# Patient Record
Sex: FEMALE | Race: WHITE | HISPANIC OR LATINO | Employment: FULL TIME | URBAN - METROPOLITAN AREA
[De-identification: names, ages, dates, MRNs, and addresses within clinical notes are randomized per-mention and may not be internally consistent; named-entity substitution may affect disease eponyms.]

---

## 2017-01-21 ENCOUNTER — HOSPITAL ENCOUNTER (OUTPATIENT)
Dept: MAMMOGRAPHY | Facility: HOSPITAL | Age: 52
Discharge: HOME | End: 2017-01-21
Payer: COMMERCIAL

## 2017-01-21 DIAGNOSIS — Z12.31 ENCOUNTER FOR SCREENING MAMMOGRAM FOR MALIGNANT NEOPLASM OF BREAST: ICD-10-CM

## 2017-01-21 DIAGNOSIS — N95.1 FEMALE CLIMACTERIC STATE: ICD-10-CM

## 2017-01-21 PROCEDURE — G0202 SCR MAMMO BI INCL CAD: HCPCS

## 2017-05-17 ENCOUNTER — ALLSCRIPTS OFFICE VISIT (OUTPATIENT)
Dept: OTHER | Facility: OTHER | Age: 52
End: 2017-05-17

## 2017-05-17 DIAGNOSIS — K21.9 GASTRO-ESOPHAGEAL REFLUX DISEASE WITHOUT ESOPHAGITIS: ICD-10-CM

## 2017-05-17 DIAGNOSIS — E55.9 VITAMIN D DEFICIENCY: ICD-10-CM

## 2017-05-17 DIAGNOSIS — J30.9 ALLERGIC RHINITIS: ICD-10-CM

## 2017-05-17 DIAGNOSIS — E03.9 HYPOTHYROIDISM: ICD-10-CM

## 2017-06-16 ENCOUNTER — GENERIC CONVERSION - ENCOUNTER (OUTPATIENT)
Dept: OTHER | Facility: OTHER | Age: 52
End: 2017-06-16

## 2017-06-16 ENCOUNTER — APPOINTMENT (OUTPATIENT)
Dept: LAB | Facility: CLINIC | Age: 52
End: 2017-06-16
Payer: COMMERCIAL

## 2017-06-16 DIAGNOSIS — K21.9 GASTRO-ESOPHAGEAL REFLUX DISEASE WITHOUT ESOPHAGITIS: ICD-10-CM

## 2017-06-16 DIAGNOSIS — E03.9 HYPOTHYROIDISM: ICD-10-CM

## 2017-06-16 DIAGNOSIS — E55.9 VITAMIN D DEFICIENCY: ICD-10-CM

## 2017-06-16 DIAGNOSIS — J30.9 ALLERGIC RHINITIS: ICD-10-CM

## 2017-06-16 LAB
25(OH)D3 SERPL-MCNC: 32.9 NG/ML (ref 30–100)
ALBUMIN SERPL BCP-MCNC: 3.7 G/DL (ref 3.5–5)
ALP SERPL-CCNC: 76 U/L (ref 46–116)
ALT SERPL W P-5'-P-CCNC: 22 U/L (ref 12–78)
ANION GAP SERPL CALCULATED.3IONS-SCNC: 5 MMOL/L (ref 4–13)
AST SERPL W P-5'-P-CCNC: 19 U/L (ref 5–45)
BASOPHILS # BLD AUTO: 0.04 THOUSANDS/ΜL (ref 0–0.1)
BASOPHILS NFR BLD AUTO: 1 % (ref 0–1)
BILIRUB SERPL-MCNC: 1.13 MG/DL (ref 0.2–1)
BILIRUB UR QL STRIP: NEGATIVE
BUN SERPL-MCNC: 16 MG/DL (ref 5–25)
CALCIUM SERPL-MCNC: 8.9 MG/DL (ref 8.3–10.1)
CHLORIDE SERPL-SCNC: 107 MMOL/L (ref 100–108)
CHOLEST SERPL-MCNC: 192 MG/DL (ref 50–200)
CLARITY UR: CLEAR
CO2 SERPL-SCNC: 28 MMOL/L (ref 21–32)
COLOR UR: YELLOW
CREAT SERPL-MCNC: 0.65 MG/DL (ref 0.6–1.3)
EOSINOPHIL # BLD AUTO: 0.08 THOUSAND/ΜL (ref 0–0.61)
EOSINOPHIL NFR BLD AUTO: 2 % (ref 0–6)
ERYTHROCYTE [DISTWIDTH] IN BLOOD BY AUTOMATED COUNT: 12.7 % (ref 11.6–15.1)
GFR SERPL CREATININE-BSD FRML MDRD: >60 ML/MIN/1.73SQ M
GLUCOSE P FAST SERPL-MCNC: 68 MG/DL (ref 65–99)
GLUCOSE UR STRIP-MCNC: NEGATIVE MG/DL
HCT VFR BLD AUTO: 40.3 % (ref 34.8–46.1)
HDLC SERPL-MCNC: 76 MG/DL (ref 40–60)
HGB BLD-MCNC: 13.2 G/DL (ref 11.5–15.4)
HGB UR QL STRIP.AUTO: NEGATIVE
KETONES UR STRIP-MCNC: NEGATIVE MG/DL
LDLC SERPL CALC-MCNC: 105 MG/DL (ref 0–100)
LEUKOCYTE ESTERASE UR QL STRIP: NEGATIVE
LYMPHOCYTES # BLD AUTO: 1.53 THOUSANDS/ΜL (ref 0.6–4.47)
LYMPHOCYTES NFR BLD AUTO: 36 % (ref 14–44)
MCH RBC QN AUTO: 28.1 PG (ref 26.8–34.3)
MCHC RBC AUTO-ENTMCNC: 32.8 G/DL (ref 31.4–37.4)
MCV RBC AUTO: 86 FL (ref 82–98)
MONOCYTES # BLD AUTO: 0.32 THOUSAND/ΜL (ref 0.17–1.22)
MONOCYTES NFR BLD AUTO: 7 % (ref 4–12)
NEUTROPHILS # BLD AUTO: 2.33 THOUSANDS/ΜL (ref 1.85–7.62)
NEUTS SEG NFR BLD AUTO: 54 % (ref 43–75)
NITRITE UR QL STRIP: NEGATIVE
NRBC BLD AUTO-RTO: 0 /100 WBCS
PH UR STRIP.AUTO: 7 [PH] (ref 4.5–8)
PLATELET # BLD AUTO: 238 THOUSANDS/UL (ref 149–390)
PMV BLD AUTO: 10.9 FL (ref 8.9–12.7)
POTASSIUM SERPL-SCNC: 4.3 MMOL/L (ref 3.5–5.3)
PROT SERPL-MCNC: 6.4 G/DL (ref 6.4–8.2)
PROT UR STRIP-MCNC: NEGATIVE MG/DL
RBC # BLD AUTO: 4.69 MILLION/UL (ref 3.81–5.12)
SODIUM SERPL-SCNC: 140 MMOL/L (ref 136–145)
SP GR UR STRIP.AUTO: 1.02 (ref 1–1.03)
T4 FREE SERPL-MCNC: 1 NG/DL (ref 0.76–1.46)
TRIGL SERPL-MCNC: 56 MG/DL
TSH SERPL DL<=0.05 MIU/L-ACNC: 4.83 UIU/ML (ref 0.36–3.74)
UROBILINOGEN UR QL STRIP.AUTO: 1 E.U./DL
WBC # BLD AUTO: 4.3 THOUSAND/UL (ref 4.31–10.16)

## 2017-06-16 PROCEDURE — 82306 VITAMIN D 25 HYDROXY: CPT

## 2017-06-16 PROCEDURE — 81003 URINALYSIS AUTO W/O SCOPE: CPT

## 2017-06-16 PROCEDURE — 84439 ASSAY OF FREE THYROXINE: CPT

## 2017-06-16 PROCEDURE — 80061 LIPID PANEL: CPT

## 2017-06-16 PROCEDURE — 80053 COMPREHEN METABOLIC PANEL: CPT

## 2017-06-16 PROCEDURE — 85025 COMPLETE CBC W/AUTO DIFF WBC: CPT

## 2017-06-16 PROCEDURE — 36415 COLL VENOUS BLD VENIPUNCTURE: CPT

## 2017-06-16 PROCEDURE — 84443 ASSAY THYROID STIM HORMONE: CPT

## 2017-07-28 DIAGNOSIS — E03.9 HYPOTHYROIDISM: ICD-10-CM

## 2017-10-11 ENCOUNTER — OFFICE VISIT (OUTPATIENT)
Dept: URGENT CARE | Age: 52
End: 2017-10-11
Payer: COMMERCIAL

## 2017-10-11 PROCEDURE — 99213 OFFICE O/P EST LOW 20 MIN: CPT

## 2017-10-13 NOTE — PROGRESS NOTES
Assessment  1  Temporomandibular disorder (524 60) (M26 609)   2  Otalgia, right (145 70) (H92 01)    Plan  Otalgia, right    · Neomycin-Polymyxin-HC 1 % Otic Solution; INSTILL 3 DROPS IN AFFECTED  EAR(S) 3-4 TIMES DAILY  Temporomandibular disorder    · Ibuprofen 600 MG Oral Tablet; TAKE 1 TABLET EVERY 6 TO 8 HOURS AS NEEDED   · Methocarbamol 750 MG Oral Tablet (Robaxin-750); 1/2 to 1 tablet every 6-8 hours  or before bed for jaw pain  Home use only    Discussion/Summary  Discussion Summary:   Take antiinflammatory as directed  Use ear drops as directed  May use muscle relaxants as needed for pain and tightness in jaw  Warm compresses to ears as needed  Avoid excessive chewing  Medication Side Effects Reviewed: Possible side effects of new medications were reviewed with the patient/guardian today  Understands and agrees with treatment plan: The treatment plan was reviewed with the patient/guardian  The patient/guardian understands and agrees with the treatment plan   Counseling Documentation With Imm: The patient was counseled regarding instructions for management  Chief Complaint  1  Ear Pain   2  Headache  Chief Complaint Free Text Note Form: bilateral ear pain radiated to the temple area and headache for 2 days  History of Present Illness  HPI: 49-year-old female with bilateral ear pain and pain in jaw that radiates up into the temple area that started a couple days ago  No visual changes or hearing changes  No URI symptoms  Hospital Based Practices Required Assessment:   Pain Assessment   the patient states they have pain  The patient describes the pain as throbbing  (on a scale of 0 to 10, the patient rates the pain at 6 )   Abuse And Domestic Violence Screen    Yes, the patient is safe at home -The patient states no one is hurting them  Depression And Suicide Screen  No, the patient has not had thoughts of hurting themself  No, the patient has not felt depressed in the past 7 days  Prefered Language is  english  Review of Systems  Focused-Female:   Constitutional: No fever, no chills, feels well, no tiredness, no recent weight gain or loss  ENT: as noted in HPI  Cardiovascular: no complaints of slow or fast heart rate, no chest pain, no palpitations, no leg claudication or lower extremity edema  Respiratory: no complaints of shortness of breath, no wheezing, no dyspnea on exertion, no orthopnea or PND  Active Problems  1  Acute bronchitis (466 0) (J20 9)   2  Allergic rhinitis (477 9) (J30 9)   3  Asthma (493 90) (J45 909)   4  Cervical cancer screening (V76 2) (Z12 4)   5  Disc degeneration (722 6)   6  Encounter for routine gynecological examination (V72 31) (Z01 419)   7  Encounter for screening mammogram for breast cancer (V76 12) (Z12 31)   8  GERD (gastroesophageal reflux disease) (530 81) (K21 9)   9  Hemorrhoids (455 6) (K64 9)   10  Hypothyroidism (244 9) (E03 9)   11  Pelvic pain syndrome (625 5) (N94 89)   12  Thyroid nodule (241 0) (E04 1)   13  Vaginal dryness, menopausal (627 2) (N95 1)   14  Vaginal Pap smear (V76 47) (Z12 72)   15  Visit for routine gyn exam (V72 31) (Z01 419)   16  Vitamin D deficiency (268 9) (E55 9)    Past Medical History  1  History of Fibroids, subserous (218 2) (D25 2)   2  History of endometriosis (V13 29) (Z87 42)   3  History of hypothyroidism (V12 29) (Z86 39)  Active Problems And Past Medical History Reviewed: The active problems and past medical history were reviewed and updated today  Family History  Mother    1  Family history of   Father    2  Family history of hypertension (V17 49) (Z82 49)   3  Family history of kidney disease (V18 69) (Z84 1)  Sister    4  Family history of cardiac disorder (V17 49) (Z82 49)   5  Family history of diabetes mellitus (V18 0) (Z83 3)   6  Family history of Hypothyroidism, goitrous  Family History Reviewed: The family history was reviewed and updated today         Social History   · Always uses seat belt   · Caffeine use (V49 89) (F15 90)   · Former smoker (L15 84) (Y83 614)   ·    · No drug use   · One child   · Social alcohol use (Z78 9)  Social History Reviewed: The social history was reviewed and updated today  Surgical History  1  History of Cholecystectomy   2  History of Hysterectomy   3  History of Tubal Ligation  Surgical History Reviewed: The surgical history was reviewed and updated today  Current Meds   1  Synthroid 137 MCG Oral Tablet; Take 1 tablet daily; Therapy: 57ELI2316 to (Last Rx:16Jun2017)  Requested for: 26ZDA8595 Ordered   2  Vitamin D 1000 UNIT CAPS; TAKE 2 CAPSULE Daily; Therapy: 95QQS6332 to Recorded  Medication List Reviewed: The medication list was reviewed and updated today  Allergies  1  Oxycodone-Acetaminophen TABS  2  Seasonal    Vitals  Signs   Recorded: 88CVX8152 07:00PM   Temperature: 97 7 F, Temporal  Heart Rate: 75  Respiration: 20  Systolic: 807  Diastolic: 55  Height: 5 ft 7 5 in  Weight: 198 lb   BMI Calculated: 30 55  BSA Calculated: 2 02  O2 Saturation: 97  LMP: full hysterectomy  Pain Scale: 6    Physical Exam    Constitutional Well-developed well-nourished female no acute distress  Eyes   Conjunctiva and lids: No swelling, erythema or discharge  Pupils and irises: Equal, round and reactive to light  Ears, Nose, Mouth, and Throat   External inspection of ears and nose: Normal     Otoscopic examination: Abnormal  -mild redness right EAC but no swelling  Tender to palpation bilateral TMJ region  Nasal mucosa, septum, and turbinates: Normal without edema or erythema  Oropharynx: Normal with no erythema, edema, exudate or lesions  Pulmonary   Respiratory effort: No increased work of breathing or signs of respiratory distress  Cardiovascular   Palpation of heart: Normal PMI, no thrills  Lymphatic   Palpation of lymph nodes in neck: No lymphadenopathy      Skin   Skin and subcutaneous tissue: Normal without rashes or lesions  Psychiatric   Orientation to person, place, and time: Normal     Mood and affect: Normal        Future Appointments    Date/Time Provider Specialty Site   11/15/2017 06:30 PM Cinthia Hebert, [facility]     Signatures   Electronically signed by :  Jess Rubinstein, [facility]; Oct 11 2017  7:17PM EST                       (Author)    Electronically signed by : Polina Carreon DO; Oct 12 2017  7:07AM EST                       (Co-author)

## 2017-11-15 ENCOUNTER — ALLSCRIPTS OFFICE VISIT (OUTPATIENT)
Dept: OTHER | Facility: OTHER | Age: 52
End: 2017-11-15

## 2017-11-27 ENCOUNTER — ALLSCRIPTS OFFICE VISIT (OUTPATIENT)
Dept: OTHER | Facility: OTHER | Age: 52
End: 2017-11-27

## 2018-01-10 NOTE — RESULT NOTES
Verified Results  (1) TSH WITH FT4 REFLEX [id number] 08:35PM Arjun Stephen     Test Name Result Flag Reference   TSH 8 760 uIU/mL H 0 358-3 740   Patients undergoing fluorescein dye angiography may retain small amounts of fluorescein in the body for 48-72 hours post procedure  Samples containing fluorescein can produce falsely depressed TSH values  If the patient had this procedure,a specimen should be resubmitted post fluorescein clearance  The recommended reference ranges for TSH during pregnancy are as follows:  First trimester 0 1 to 2 5 uIU/mL  Second trimester  0 2 to 3 0 uIU/mL  Third trimester 0 3 to 3 0 uIU/m     (1) TSH WITH FT4 REFLEX [id number] 08:35PM [facility]     Test Name Result Flag Reference   T4,FREE 0 94 ng/dL  0 76-1 46       Discussion/Summary   Thyroid function is underactive  Recommend patient increase Synthroid to 125 Âµg daily and recheck TSH and free T4 in 6 weeks

## 2018-01-12 NOTE — RESULT NOTES
Discussion/Summary   Labs okay except thyroid function is mildly underactive  Recommend patient increase Synthroid to 137 Âµg daily and recheck TSH in 6 weeks  Verified Results  (1) CBC/PLT/DIFF 44KEH0722 08:05AM [facility] Order Number: [id number]     Test Name Result Flag Reference   WBC COUNT 4 30 Thousand/uL L 4 31-10 16   RBC COUNT 4 69 Million/uL  3 81-5 12   HEMOGLOBIN 13 2 g/dL  11 5-15 4   HEMATOCRIT 40 3 %  34 8-46  1   MCV 86 fL  82-98   MCH 28 1 pg  26 8-34 3   MCHC 32 8 g/dL  31 4-37 4   RDW 12 7 %  11 6-15 1   MPV 10 9 fL  8 9-12 7   PLATELET COUNT 256 Thousands/uL  149-390   nRBC AUTOMATED 0 /100 WBCs     NEUTROPHILS RELATIVE PERCENT 54 %  43-75   LYMPHOCYTES RELATIVE PERCENT 36 %  14-44   MONOCYTES RELATIVE PERCENT 7 %  4-12   EOSINOPHILS RELATIVE PERCENT 2 %  0-6   BASOPHILS RELATIVE PERCENT 1 %  0-1   NEUTROPHILS ABSOLUTE COUNT 2 33 Thousands/? ??L  1 85-7 62   LYMPHOCYTES ABSOLUTE COUNT 1 53 Thousands/? ??L  0 60-4 47   MONOCYTES ABSOLUTE COUNT 0 32 Thousand/? ??L  0 17-1 22   EOSINOPHILS ABSOLUTE COUNT 0 08 Thousand/? ??L  0 00-0 61   BASOPHILS ABSOLUTE COUNT 0 04 Thousands/? ??L  0 00-0 10     (1) COMPREHENSIVE METABOLIC PANEL 35OMI8098 69:12MU [facility] Order Number: [id number]     Test Name Result Flag Reference   SODIUM 140 mmol/L  136-145   POTASSIUM 4 3 mmol/L  3 5-5 3   CHLORIDE 107 mmol/L  100-108   CARBON DIOXIDE 28 mmol/L  21-32   ANION GAP (CALC) 5 mmol/L  4-13   BLOOD UREA NITROGEN 16 mg/dL  5-25   CREATININE 0 65 mg/dL  0 60-1 30   Standardized to IDMS reference method   CALCIUM 8 9 mg/dL  8 3-10 1   BILI, TOTAL 1 13 mg/dL H 0 20-1 00   ALK PHOSPHATAS 76 U/L     ALT (SGPT) 22 U/L  12-78   AST(SGOT) 19 U/L  5-45   ALBUMIN 3 7 g/dL  3 5-5 0   TOTAL PROTEIN 6 4 g/dL  6 4-8 2   eGFR Non-African American      >60 0 ml/min/1 73sq m   [employer] Disease Education Program recommendations are as follows:  GFR calculation is accurate only with a steady state creatinine  Chronic Kidney disease less than 60 ml/min/1 73 sq  meters  Kidney failure less than 15 ml/min/1 73 sq  meters  GLUCOSE FASTING 68 mg/dL  65-99     (1) LIPID PANEL, FASTING 16Jun2017 08:05AM [facility] Order Number: [id number]     Test Name Result Flag Reference   CHOLESTEROL 192 mg/dL     HDL,DIRECT 76 mg/dL H 40-60   Specimen collection should occur prior to Metamizole administration due to the potential for falsely depressed results  LDL CHOLESTEROL CALCULATED 105 mg/dL H 0-100   Triglyceride:         Normal              <150 mg/dl       Borderline High    150-199 mg/dl       High               200-499 mg/dl       Very High          >499 mg/dl  Cholesterol:         Desirable        <200 mg/dl      Borderline High  200-239 mg/dl      High             >239 mg/dl  HDL Cholesterol:        High    >59 mg/dL      Low     <41 mg/dL  LDL CALCULATED:    This screening LDL is a calculated result  It does not have the accuracy of the Direct Measured LDL in the monitoring of patients with hyperlipidemia and/or statin therapy  Direct Measure LDL (RSD911) must be ordered separately in these patients  TRIGLYCERIDES 56 mg/dL  <=150   Specimen collection should occur prior to N-Acetylcysteine or Metamizole administration due to the potential for falsely depressed results  (1) TSH WITH FT4 REFLEX [id number] 08:05AM [facility] Order Number: [id number]     Test Name Result Flag Reference   TSH 4 830 uIU/mL H 0 358-3 740   Patients undergoing fluorescein dye angiography may retain small amounts of fluorescein in the body for 48-72 hours post procedure  Samples containing fluorescein can produce falsely depressed TSH values  If the patient had this procedure,a specimen should be resubmitted post fluorescein clearance            The recommended reference ranges for TSH during pregnancy are as follows:  First trimester 0 1 to 2 5 uIU/mL  Second trimester  0 2 to 3 0 uIU/mL  Third trimester 0 3 to 3 0 uIU/m     (1) VITAMIN D 25-HYDROXY 72IAW2595 08:05AM Stephon Sebastian Order Number: [id number]     Test Name Result Flag Reference   VIT D 25-HYDROX 32 9 ng/mL  30 0-100 0   This assay is a certified procedure of the CDC Vitamin D Standardization Certification Program (VDSCP)     Deficiency <20ng/ml   Insufficiency 20-30ng/ml   Sufficient  ng/ml     *Patients undergoing fluorescein dye angiography may retain small amounts of fluorescein in the body for 48-72 hours post procedure  Samples containing fluorescein can produce falsely elevated Vitamin D values  If the patient had this procedure, a specimen should be resubmitted post fluorescein clearance  (1) URINALYSIS w URINE C/S REFLEX (will reflex a microscopy if leukocytes, occult blood, or nitrites are not within normal limits) [id number] 08:05AM Stephon Sebastian Order Number: [id number]     Test Name Result Flag Reference   COLOR Yellow     CLARITY Clear     PH UA 7 0  4 5-8 0   LEUKOCYTE ESTERASE UA Negative  Negative   NITRITE UA Negative  Negative   PROTEIN UA Negative mg/dl  Negative   GLUCOSE UA Negative mg/dl  Negative   KETONES UA Negative mg/dl  Negative   UROBILINOGEN UA 1 0 E U /dl  0 2, 1 0 E U /dl   BILIRUBIN UA Negative  Negative   BLOOD UA Negative  Negative   SPECIFIC GRAVITY UA 1 020  1 003-1 030       Plan  Hypothyroidism    · (1) TSH WITH FT4 REFLEX; Status:Active;  Requested for:62Kat9267;

## 2018-01-12 NOTE — RESULT NOTES
Verified Results  (1) TSH WITH FT4 REFLEX [id number] 07:18AM Jessie Dalzell Order Number: [id number]     Test Name Result Flag Reference   TSH 1 390 uIU/mL  0 358-3 740   Patients undergoing fluorescein dye angiography may retain small amounts of fluorescein in the body for 48-72 hours post procedure  Samples containing fluorescein can produce falsely depressed TSH values  If the patient had this procedure,a specimen should be resubmitted post fluorescein clearance  The recommended reference ranges for TSH during pregnancy are as follows:  First trimester 0 1 to 2 5 uIU/mL  Second trimester  0 2 to 3 0 uIU/mL  Third trimester 0 3 to 3 0 uIU/m       Discussion/Summary   TSH/thyroid function is normal  Continue Synthroid same dose

## 2018-01-14 VITALS
WEIGHT: 201 LBS | SYSTOLIC BLOOD PRESSURE: 102 MMHG | BODY MASS INDEX: 30.46 KG/M2 | TEMPERATURE: 98.6 F | HEART RATE: 72 BPM | RESPIRATION RATE: 16 BRPM | DIASTOLIC BLOOD PRESSURE: 70 MMHG | HEIGHT: 68 IN

## 2018-01-14 VITALS
WEIGHT: 195 LBS | DIASTOLIC BLOOD PRESSURE: 68 MMHG | BODY MASS INDEX: 30.09 KG/M2 | SYSTOLIC BLOOD PRESSURE: 106 MMHG | TEMPERATURE: 97.4 F | HEART RATE: 64 BPM | RESPIRATION RATE: 16 BRPM

## 2018-01-14 NOTE — PROGRESS NOTES
Assessment    1  Encounter for routine gynecological examination (V72 31) (Z01 419)   2  Vaginal Pap smear (V76 47) (Z12 72)   3  Vaginal dryness, menopausal (627 2) (N95 1)    Plan  Encounter for screening mammogram for breast cancer    · Follow-up visit in 6 weeks Evaluation and Treatment  Follow-up  Status: Hold For -  Scheduling  Requested for: 05Fln5642   Ordered; For: Encounter for screening mammogram for breast cancer; Ordered By: Stas Flynn Performed:  Due: 94ERR0408  Encounter for screening mammogram for breast cancer, Vaginal dryness, menopausal    · * MAMMO SCREENING BILATERAL W CAD; Status:Hold For - Scheduling; Requested  for:53Zue1858;    Perform:[facility]; TPL:47UYI1704;XWZSQQV;  Isabella Ramos for screening mammogram for breast cancer, Vaginal dryness, menopausal; Ordered By:Riedel, C Bradly Chess;  Vaginal dryness, menopausal    · Estrace 0 1 MG/GM Vaginal Cream; 1/4 - 1/2  Applicator @ Bedtime, Monday and  Thursday nights   Rx By: Stas Flynn; Dispense: 0 Days ; #:1 X 42 5 GM Tube; Refill: 0; For: Vaginal dryness, menopausal; CONCHA = N; Sent To: [facility]   Discussion/Summary    As noted above here for annual exam Pap pelvic and breast exam  We discussed the possible treatments for hot flashes and vaginal dryness  She is we'll try Estrace vaginal cream 2-3 times per week as directed  Follow-up in 6 weeks to evaluate for improvement in her symptoms  She she declines systemic Estridge and replacement at this time  Chief Complaint  pt is here for her annual exam, pt had no complaints    History of Present Illness  HPI: 57-year-old female here for annual exam Pap pelvic and breast exam  Patient had a total laparoscopic hysterectomy LSO several years ago  She does complain of occasional intermittent hot flashes and worsening vaginal dryness causing dyspareunia  No other GYN concerns or complaints        Review of Systems    Constitutional: No fever, no chills, feels well, no tiredness, no recent weight gain or loss  ENT: no ear ache, no loss of hearing, no nosebleeds or nasal discharge, no sore throat or hoarseness  Cardiovascular: no complaints of slow or fast heart rate, no chest pain, no palpitations, no leg claudication or lower extremity edema  Respiratory: no complaints of shortness of breath, no wheezing, no dyspnea on exertion, no orthopnea or PND  Breasts: no complaints of breast pain, breast lump or nipple discharge  Gastrointestinal: no complaints of abdominal pain, no constipation, no nausea or diarrhea, no vomiting, no bloody stools  Genitourinary: no complaints of dysuria, no incontinence, no pelvic pain, no dysmenorrhea, no vaginal discharge or abnormal vaginal bleeding  Musculoskeletal: no complaints of arthralgia, no myalgia, no joint swelling or stiffness, no limb pain or swelling  Integumentary: no complaints of skin rash or lesion, no itching or dry skin, no skin wounds  Neurological: no complaints of headache, no confusion, no numbness or tingling, no dizziness or fainting  Active Problems    1  Allergic rhinitis (477 9) (J30 9)   2  Asthma (493 90) (J45 909)   3  Disc degeneration (722 6)   4  Hypothyroidism (244 9) (E03 9)   5  Pelvic pain syndrome (625 5) (N94 89)   6  Thyroid nodule (241 0) (E04 1)   7  Vaginal Pap smear (V76 47) (Z12 72)   8  Visit for routine gyn exam (V72 31) (Z01 419)   9   Vitamin D deficiency (268 9) (E55 9)    Past Medical History    · History of Fibroids, subserous (218 2) (D25 2)   · History of endometriosis (V13 29) (Z87 42)   · History of hypothyroidism (V12 29) (Z86 39)    Surgical History    · History of Cholecystectomy   · History of Hysterectomy   · History of Tubal Ligation    Family History  Mother    · Family history of   Father    · Family history of hypertension (V17 49) (Z82 49)   · Family history of kidney disease (V22 75) (Z80 4)  Sister    · Family history of cardiac disorder (V17 49) (Z82 49)   · Family history of diabetes mellitus (V18 0) (Z83 3)   · Family history of Hypothyroidism, goitrous    Social History    · Always uses seat belt   · Caffeine use (V49 89) (F15 90)   · Former smoker (K28 81) (I94 985)   ·    · No drug use   · One child   · Social alcohol use (Z78 9)    Current Meds   1  Meclizine HCl - 12 5 MG Oral Tablet; TAKE 1 TABLET 3 TIMES DAILY AS NEEDED; Therapy: 64ANB1407 to (Evaluate:60Bcl6056)  Requested for: 51BHB8126; Last   Rx:11May2016 Ordered   2  Synthroid 125 MCG Oral Tablet; TAKE 1 TABLET DAILY; Therapy: 27QBG9711 to (Evaluate:65Phc8686)  Requested for: 64Ruu6760; Last   Rx:07Qcq6797 Ordered   3  Transderm-Scop (1 5 MG) 1 MG/3DAYS Transdermal Patch 72 Hour; APPLY 1 PATCH   EVERY 3 DAYS; Therapy: 10ESE5698 to (Evaluate:01Dzc6313); Last Rx:11May2016 Ordered   4  Vitamin D 1000 UNIT CAPS; TAKE 2 CAPSULE Daily; Therapy: 50MWK6643 to Recorded    Allergies    1  Oxycodone-Acetaminophen TABS    2  Seasonal    Vitals   Recorded: 02UQH5239 78:84YK   Systolic 109, LUE, Sitting   Diastolic 70, LUE, Sitting   LMP hysterectomy   Height 5 ft 7 in   Weight 194 lb    BMI Calculated 30 38   BSA Calculated 2     Physical Exam    Constitutional   General appearance: No acute distress, well appearing and well nourished  Neck   Neck: Normal, supple, trachea midline, no masses  Thyroid: Normal, no thyromegaly  Pulmonary   Respiratory effort: No increased work of breathing or signs of respiratory distress  Auscultation of lungs: Clear to auscultation  Cardiovascular   Auscultation of heart: Normal rate and rhythm, normal S1 and S2, no murmurs  Peripheral vascular exam: Normal pulses Throughout  Genitourinary   External genitalia: Normal and no lesions appreciated  Vagina: Normal, no lesions or dryness appreciated  Urethra: Normal     Urethral meatus: Normal     Bladder: Normal, soft, non-tender and no prolapse or masses appreciated      Cervix: Surgically absent  Uterus: Surgically absent  Adnexa/parametria: Surgically absent  LSO, no pelvic masses  Anus, perineum, and rectum: Normal sphincter tone, no masses, and no prolapse  Chest   Breasts: Normal and no dimpling or skin changes noted  Abdomen   Abdomen: Normal, non-tender, and no organomegaly noted  Liver and spleen: No hepatomegaly or splenomegaly  Examination for hernias: No hernias appreciated  Stool sample for occult blood: Negative  Lymphatic   Palpation of lymph nodes in neck, axillae, groin and/or other locations: No lymphadenopathy or masses noted  Skin   Skin and subcutaneous tissue: Normal skin turgor and no rashes      Palpation of skin and subcutaneous tissue: Normal     Psychiatric   Orientation to person, place, and time: Normal     Mood and affect: Normal        Future Appointments    Date/Time Provider Specialty Site   11/16/2016 06:30 PM [place], [facility]     Signatures   Electronically signed by : Ann Bass DO; Sep 12 2016 11:38AM EST                       (Author)

## 2018-01-18 NOTE — RESULT NOTES
Verified Results  (1) CBC/PLT/DIFF [id number] 10:47AM Vinh Bacon     Test Name Result Flag Reference   WBC COUNT 4 64 Thousand/uL  4 31-10 16   RBC COUNT 4 85 Million/uL  3 81-5 12   HEMOGLOBIN 13 5 g/dL  11 5-15 4   HEMATOCRIT 42 2 %  34 8-46  1   MCV 87 fL  82-98   MCH 27 8 pg  26 8-34 3   MCHC 32 0 g/dL  31 4-37 4   RDW 12 4 %  11 6-15 1   MPV 10 8 fL  8 9-12 7   PLATELET COUNT 865 Thousands/uL  149-390   nRBC AUTOMATED 0 /100 WBCs     NEUTROPHILS RELATIVE PERCENT 57 %  43-75   LYMPHOCYTES RELATIVE PERCENT 30 %  14-44   MONOCYTES RELATIVE PERCENT 10 %  4-12   EOSINOPHILS RELATIVE PERCENT 2 %  0-6   BASOPHILS RELATIVE PERCENT 1 %  0-1   NEUTROPHILS ABSOLUTE COUNT 2 67 Thousands/?L  1 85-7 62   LYMPHOCYTES ABSOLUTE COUNT 1 40 Thousands/?L  0 60-4 47   MONOCYTES ABSOLUTE COUNT 0 45 Thousand/?L  0 17-1 22   EOSINOPHILS ABSOLUTE COUNT 0 07 Thousand/?L  0 00-0 61   BASOPHILS ABSOLUTE COUNT 0 04 Thousands/?L  0 00-0 10     (1) COMPREHENSIVE METABOLIC PANEL [id number] 23:13OJ Vinh Bacon     Test Name Result Flag Reference   GLUCOSE,RANDM 79 mg/dL     If the patient is fasting, the ADA then defines impaired fasting glucose as > 100 mg/dL and diabetes as > or equal to 123 mg/dL  SODIUM 141 mmol/L  136-145   POTASSIUM 4 2 mmol/L  3 5-5 3   CHLORIDE 106 mmol/L  100-108   CARBON DIOXIDE 29 mmol/L  21-32   ANION GAP (CALC) 6 mmol/L  4-13   BLOOD UREA NITROGEN 16 mg/dL  5-25   CREATININE 0 66 mg/dL  0 60-1 30   Standardized to IDMS reference method   CALCIUM 9 6 mg/dL  8 3-10 1   BILI, TOTAL 1 87 mg/dL H 0 20-1 00   ALK PHOSPHATAS 81 U/L     ALT (SGPT) 23 U/L  12-78   AST(SGOT) 19 U/L  5-45   ALBUMIN 3 9 g/dL  3 5-5 0   TOTAL PROTEIN 7 2 g/dL  6 4-8 2   eGFR Non-African American      >60 0 ml/min/1 73sq m   [facility] Disease Education Program recommendations are as follows:  GFR calculation is accurate only with a steady state creatinine  Chronic Kidney disease less than 60 ml/min/1 73 sq  meters  Kidney failure less than 15 ml/min/1 73 sq  meters  (1) LIPID PANEL, FASTING [id number] 10:47AM [employer]     Test Name Result Flag Reference   CHOLESTEROL 190 mg/dL     HDL,DIRECT 77 mg/dL H 40-60   Specimen collection should occur prior to Metamizole administration due to the potential for falsely depressed results  LDL CHOLESTEROL CALCULATED 102 mg/dL H 0-100   Triglyceride:         Normal              <150 mg/dl       Borderline High    150-199 mg/dl       High               200-499 mg/dl       Very High          >499 mg/dl  Cholesterol:         Desirable        <200 mg/dl      Borderline High  200-239 mg/dl      High             >239 mg/dl  HDL Cholesterol:        High    >59 mg/dL      Low     <41 mg/dL  LDL CALCULATED:    This screening LDL is a calculated result  It does not have the accuracy of the Direct Measured LDL in the monitoring of patients with hyperlipidemia and/or statin therapy  Direct Measure LDL (WGC808) must be ordered separately in these patients  TRIGLYCERIDES 53 mg/dL  <=150   Specimen collection should occur prior to N-Acetylcysteine or Metamizole administration due to the potential for falsely depressed results  (1) TSH [id number] 10:47AM [employer]     Test Name Result Flag Reference   TSH 0 515 uIU/mL  0 358-3 740   Patients undergoing fluorescein dye angiography may retain small amounts of fluorescein in the body for 48-72 hours post procedure  Samples containing fluorescein can produce falsely depressed TSH values  If the patient had this procedure,a specimen should be resubmitted post fluorescein clearance            The recommended reference ranges for TSH during pregnancy are as follows:  First trimester 0 1 to 2 5 uIU/mL  Second trimester  0 2 to 3 0 uIU/mL  Third trimester 0 3 to 3 0 uIU/m     (1) VITAMIN D 25-HYDROXY [id number] 10:47AM [employer]     Test Name Result Flag Reference   VIT D 25-HYDROX 31 2 ng/mL  30 0-100 0   This assay is a certified procedure of the CDC Vitamin D Standardization Certification Program (VDSCP)     Deficiency <20ng/ml   Insufficiency 20-30ng/ml   Sufficient  ng/ml     *Patients undergoing fluorescein dye angiography may retain small amounts of fluorescein in the body for 48-72 hours post procedure  Samples containing fluorescein can produce falsely elevated Vitamin D values  If the patient had this procedure, a specimen should be resubmitted post fluorescein clearance  (1) URINALYSIS w URINE C/S REFLEX (will reflex a microscopy if leukocytes, occult blood, or nitrites are not within normal limits) [id number] 10:47AM Annette Enriquez     Test Name Result Flag Reference   COLOR Yellow     CLARITY Clear     PH UA 6 0  4 5-8 0   LEUKOCYTE ESTERASE UA Negative  Negative   NITRITE UA Negative  Negative   PROTEIN UA Negative mg/dl  Negative   GLUCOSE UA Negative mg/dl  Negative   KETONES UA Negative mg/dl  Negative   UROBILINOGEN UA 0 2 E U /dl  0 2, 1 0 E U /dl   BILIRUBIN UA Negative  Negative   BLOOD UA Negative  Negative   SPECIFIC GRAVITY UA 1 024  1 003-1 030       Discussion/Summary   Labs okay  Continue present treatment

## 2018-01-22 VITALS
WEIGHT: 195 LBS | RESPIRATION RATE: 16 BRPM | TEMPERATURE: 97.4 F | HEART RATE: 72 BPM | SYSTOLIC BLOOD PRESSURE: 110 MMHG | HEIGHT: 68 IN | DIASTOLIC BLOOD PRESSURE: 72 MMHG | BODY MASS INDEX: 29.55 KG/M2

## 2018-02-12 ENCOUNTER — TELEPHONE (OUTPATIENT)
Dept: FAMILY MEDICINE CLINIC | Facility: CLINIC | Age: 53
End: 2018-02-12

## 2018-02-12 DIAGNOSIS — T75.3XXD MOTION SICKNESS, SUBSEQUENT ENCOUNTER: Primary | ICD-10-CM

## 2018-02-12 RX ORDER — MECLIZINE HCL 12.5 MG/1
12.5 TABLET ORAL 3 TIMES DAILY PRN
Qty: 30 TABLET | Refills: 0 | Status: SHIPPED | OUTPATIENT
Start: 2018-02-12 | End: 2018-07-12 | Stop reason: ALTCHOICE

## 2018-02-12 NOTE — TELEPHONE ENCOUNTER
Please call patient to find out who had prescribed meclizine to her in the past and for what purpose

## 2018-02-12 NOTE — TELEPHONE ENCOUNTER
Pt is requesting a refill on Meclizine 12 5 mg  #60 take 1-2 tablets prn  I do not see this on her medication list  Please advise      [phone]

## 2018-03-02 NOTE — PROGRESS NOTES
Assessment    1  Hypothyroidism (244 9) (E03 9)   2  Vitamin D deficiency (268 9) (E55 9)   3  GERD (gastroesophageal reflux disease) (530 81) (K21 9)   4  Allergic rhinitis (477 9) (J30 9)   5  Asthma (493 90) (J45 909)    Plan  Hypothyroidism    · (1) TSH WITH FT4 REFLEX; Status:Hold For - Exact Date; Requested for: [place] [address] [facility],[address]; Discussion/Summary    Labs drawn for TSH with reflex to free T4, will heed results  Patient to continue present treatment  Discussed proper nutrition and regular exercise  Patient to return to the office in 6 months  Possible side effects of new medications were reviewed with the patient/guardian today  The treatment plan was reviewed with the patient/guardian  The patient/guardian understands and agrees with the treatment plan      Chief Complaint  Nonfasting   Patient is here today for follow up of chronic conditions described in HPI  History of Present Illness  Patient is here for routine appointment for chronic conditions and she is not fasting today  Patient has had her yearly flu vaccine  Patient had her levothyroxine dose increased several months ago and has not had recheck of her TSH  Patient is feeling well overall and recently started a new protein diet  No regular exercise program    The patient is being seen for follow-up of gastroesophageal reflux disease  The patient reports doing well  She has had no significant interval events  Interval symptoms:  denies heartburn,-- denies chest pain,-- denies abdominal pain,-- denies acid regurgitation-- and-- denies dysphagia  Associated symptoms: no hoarseness,-- no cough,-- no nausea,-- no vomiting,-- no hematemesis-- and-- no melena  The patient is not currently on medication for this problem  Disease management:  the patient is doing well with her goals  The patient reports doing well  She has had no significant interval events    Interval symptoms:  denies weight gain,-- denies cold intolerance,-- denies Lm for pt (Kim Salas per HIPAA) to inform results from 2/28/18 ordered by Kristy Castaneda- Dr. Hernan Kohli- informed to call their office regarding results. To call back at the office if any further questions. fatigue,-- denies weakness,-- denies constipation,-- denies dyspnea on exertion,-- denies trouble concentrating,-- denies hair loss-- and-- stable dry skin  Associated symptoms: no myalgias,-- no arthralgias,-- no paresthesias,-- no depression,-- no leg swelling-- and-- no palpitations--   The patient presents with complaints of < 10 pound weight loss  Medications:  the patient is adherent to her medication regimen, but-- she denies medication side effects  Disease management:  the patient is doing well with her goals  Due for: thyroid stimulating hormone  The patient is being seen for follow-up of vitamin D deficiency  Disease type: vitamin D deficiency  Current treatment includes vitamin D3 (cholecalciferol)  Symptoms:  no fatigue,-- no bone pain,-- no muscle pain,-- no muscle weakness,-- no muscle cramps,-- no paresthesias-- and-- no gait abnormality  The patient is currently experiencing symptoms  Review of Systems   Constitutional: no fever,-- not feeling poorly,-- no chills-- and-- not feeling tired  Eyes: No complaints of eye pain, no red eyes, no eyesight problems, no discharge, no dry eyes, no itching of eyes  ENT: no complaints of earache, no loss of hearing, no nose bleeds, no nasal discharge, no sore throat, no hoarseness  Genitourinary: no dysuria-- and-- no incontinence  Hematologic/Lymphatic: No complaints of swollen glands, no swollen glands in the neck, does not bleed easily, does not bruise easily  Active Problems  1  Allergic rhinitis (477 9) (J30 9)   2  Asthma (493 90) (J45 909)   3  Cervical cancer screening (V76 2) (Z12 4)   4  Disc degeneration (722 6)   5  Encounter for routine gynecological examination (V72 31) (Z01 419)   6  Encounter for screening mammogram for breast cancer (V76 12) (Z12 31)   7  GERD (gastroesophageal reflux disease) (530 81) (K21 9)   8  Hemorrhoids (455 6) (K64 9)   9  Hypothyroidism (244 9) (E03 9)   10  Pelvic pain syndrome (625 5) (N94 89)   11  Temporomandibular disorder (524 60) (M26 609)   12  Thyroid nodule (241 0) (E04 1)   13  Vaginal dryness, menopausal (627 2) (N95 1)   14  Vaginal Pap smear (V76 47) (Z12 72)   15  Visit for routine gyn exam (V72 31) (Z01 419)   16  Vitamin D deficiency (268 9) (E55 9)    Past Medical History  1  History of Fibroids, subserous (218 2) (D25 2)   2  History of endometriosis (V13 29) (Z87 42)   3  History of hypothyroidism (V12 29) (Z86 39)    Surgical History  1  History of Cholecystectomy   2  History of Hysterectomy   3  History of Tubal Ligation    Family History  Mother    1  Family history of   Father    2  Family history of hypertension (V17 49) (Z82 49)   3  Family history of kidney disease (V18 69) (Z84 1)  Sister    4  Family history of cardiac disorder (V17 49) (Z82 49)   5  Family history of diabetes mellitus (V18 0) (Z83 3)   6  Family history of Hypothyroidism, goitrous    Social History     · Always uses seat belt   · Caffeine use (V49 89) (F15 90)   · Former smoker (T68 07) (A61 113)   ·    · No drug use   · One child   · Social alcohol use (Z78 9)    Current Meds   1  Ibuprofen 600 MG Oral Tablet; TAKE 1 TABLET EVERY 6 TO 8 HOURS AS NEEDED; Therapy: 96OAJ3867 to (40-45-11-94)  Requested for: 88WTX9023; Last Rx:2017 Ordered   2  Synthroid 137 MCG Oral Tablet; Take 1 tablet daily; Therapy: 20QJQ3770 to (Last Rx:2017)  Requested for: 27DUS9491 Ordered   3  Vitamin D 1000 UNIT CAPS; TAKE 2 CAPSULE Daily; Therapy: 62VSA7298 to Recorded    Allergies  1  Oxycodone-Acetaminophen TABS  2  Seasonal    Vitals  Vital Signs    Recorded: 32QNI0814 07:06PM Recorded: 86SPR2032 06:09PM   Temperature  97 4 F   Heart Rate 64    Respiration 16    Systolic 608    Diastolic 68    Weight  604 lb    BMI Calculated  30 09   BSA Calculated  2 01       Physical Exam   Constitutional  General appearance: No acute distress, well appearing and well nourished     Eyes  Conjunctiva and lids: No swelling, erythema or discharge  Ears, Nose, Mouth, and Throat  External inspection of ears and nose: Normal    Otoscopic examination: Tympanic membranes translucent with normal light reflex  Canals patent without erythema  Nasal mucosa, septum, and turbinates: Normal without edema or erythema  Oropharynx: Normal with no erythema, edema, exudate or lesions  Pulmonary  Respiratory effort: No increased work of breathing or signs of respiratory distress  Auscultation of lungs: Clear to auscultation  Cardiovascular  Auscultation of heart: Normal rate and rhythm, normal S1 and S2, without murmurs  Examination of extremities for edema and/or varicosities: Normal    Carotid pulses: Normal    Abdomen  Abdomen: Non-tender, no masses  Lymphatic  Palpation of lymph nodes in neck: No lymphadenopathy  Musculoskeletal  Gait and station: Normal    Inspection/palpation of joints, bones, and muscles: Normal    Skin  Skin and subcutaneous tissue: Normal without rashes or lesions     Psychiatric  Orientation to person, place, and time: Normal    Mood and affect: Normal          Signatures   Electronically signed by : Chris Borden DO; Nov 15 2017  7:16PM EST                       (Author)

## 2018-05-24 DIAGNOSIS — E03.9 HYPOTHYROIDISM, UNSPECIFIED TYPE: Primary | ICD-10-CM

## 2018-05-24 RX ORDER — LEVOTHYROXINE SODIUM 137 MCG
TABLET ORAL
Qty: 90 TABLET | Refills: 3 | Status: SHIPPED | OUTPATIENT
Start: 2018-05-24 | End: 2018-10-12 | Stop reason: SDUPTHER

## 2018-05-28 PROBLEM — M26.609 TEMPOROMANDIBULAR DISORDER: Status: ACTIVE | Noted: 2017-10-11

## 2018-05-30 ENCOUNTER — OFFICE VISIT (OUTPATIENT)
Dept: FAMILY MEDICINE CLINIC | Facility: CLINIC | Age: 53
End: 2018-05-30
Payer: COMMERCIAL

## 2018-05-30 VITALS
SYSTOLIC BLOOD PRESSURE: 104 MMHG | RESPIRATION RATE: 16 BRPM | WEIGHT: 198 LBS | DIASTOLIC BLOOD PRESSURE: 72 MMHG | HEIGHT: 67 IN | HEART RATE: 68 BPM | TEMPERATURE: 97.8 F | BODY MASS INDEX: 31.08 KG/M2

## 2018-05-30 DIAGNOSIS — E55.9 VITAMIN D DEFICIENCY: ICD-10-CM

## 2018-05-30 DIAGNOSIS — K21.9 GASTROESOPHAGEAL REFLUX DISEASE WITHOUT ESOPHAGITIS: ICD-10-CM

## 2018-05-30 DIAGNOSIS — E03.9 HYPOTHYROIDISM, UNSPECIFIED TYPE: Primary | ICD-10-CM

## 2018-05-30 PROCEDURE — 99214 OFFICE O/P EST MOD 30 MIN: CPT | Performed by: FAMILY MEDICINE

## 2018-05-30 RX ORDER — BIOTIN 1 MG
2 TABLET ORAL DAILY
COMMUNITY
Start: 2015-11-04

## 2018-05-30 NOTE — PROGRESS NOTES
Assessment/Plan:  Patient given lab requisition for fasting labs as below  Patient to continue present treatment  Patient instructed to follow a low-fat diet and get regular exercise 150 min per week  Return to the office in 6 months  GERD (gastroesophageal reflux disease)  Asymptomatic on no current treatment  Hypothyroidism  Clinically stable  Labs ordered for TSH with reflex to free T4  Continue Synthroid 137 mcg daily  Vitamin D deficiency  Continue vitamin-D supplement  Diagnoses and all orders for this visit:    Hypothyroidism, unspecified type  -     CBC and Platelet; Future  -     Comprehensive metabolic panel; Future  -     Lipid panel; Future  -     TSH, 3rd generation with T4 reflex; Future  -     UA w Reflex to Microscopic w Reflex to Culture -Lab Collect; Future    Vitamin D deficiency  -     Vitamin D 25 hydroxy; Future    Gastroesophageal reflux disease without esophagitis    Other orders  -     Cholecalciferol (VITAMIN D3) 1000 units CAPS; Take 2 capsules by mouth daily          Subjective:      Patient ID: Nasir Carballo is a 45 y o  female  Patient is here for routine appointment for chronic conditions and she is not fasting today  Patient has been feeling well overall  No regular exercise program patient remains fairly active doing housework and yd work  Thyroid Problem   Presents for follow-up visit  Symptoms include dry skin  Patient reports no anxiety, cold intolerance, constipation, depressed mood, diaphoresis, diarrhea, fatigue, hair loss, heat intolerance, hoarse voice, leg swelling, nail problem, palpitations, tremors, visual change, weight gain or weight loss  The symptoms have been stable  Heartburn   She reports no abdominal pain, no belching, no chest pain, no dysphagia, no early satiety, no globus sensation, no heartburn, no hoarse voice or no nausea  The problem has been resolved  Nothing aggravates the symptoms   Pertinent negatives include no anemia, fatigue, melena, muscle weakness, orthopnea or weight loss  The treatment provided significant relief  The following portions of the patient's history were reviewed and updated as appropriate: allergies, current medications, past family history, past medical history, past social history, past surgical history and problem list     Review of Systems   Constitutional: Negative for diaphoresis, fatigue, weight gain and weight loss  HENT: Negative for hoarse voice  Cardiovascular: Negative for chest pain and palpitations  Gastrointestinal: Negative for abdominal pain, constipation, diarrhea, dysphagia, heartburn, melena and nausea  Endocrine: Negative for cold intolerance and heat intolerance  Musculoskeletal: Negative for muscle weakness  Neurological: Negative for tremors  Psychiatric/Behavioral: The patient is not nervous/anxious  Objective:      /72   Pulse 68   Temp 97 8 °F (36 6 °C)   Resp 16   Ht 5' 7" (1 702 m)   Wt 89 8 kg (198 lb)   BMI 31 01 kg/m²          Physical Exam   Constitutional: She is oriented to person, place, and time  She appears well-developed and well-nourished  HENT:   Head: Normocephalic  Right Ear: External ear normal    Left Ear: External ear normal    Nose: Nose normal    Mouth/Throat: Oropharynx is clear and moist    Eyes: Conjunctivae are normal  No scleral icterus  Neck: Neck supple  No thyromegaly present  Cardiovascular: Normal rate and regular rhythm  Pulmonary/Chest: Effort normal and breath sounds normal    Abdominal: Soft  There is no tenderness  Musculoskeletal: She exhibits no edema  Lymphadenopathy:     She has no cervical adenopathy  Neurological: She is alert and oriented to person, place, and time  Skin: Skin is warm and dry  Psychiatric: She has a normal mood and affect

## 2018-07-12 ENCOUNTER — HOSPITAL ENCOUNTER (EMERGENCY)
Facility: HOSPITAL | Age: 53
Discharge: HOME | End: 2018-07-12
Attending: EMERGENCY MEDICINE | Admitting: EMERGENCY MEDICINE
Payer: COMMERCIAL

## 2018-07-12 ENCOUNTER — APPOINTMENT (EMERGENCY)
Dept: ULTRASOUND IMAGING | Facility: HOSPITAL | Age: 53
End: 2018-07-12
Payer: COMMERCIAL

## 2018-07-12 VITALS
RESPIRATION RATE: 20 BRPM | TEMPERATURE: 98.1 F | DIASTOLIC BLOOD PRESSURE: 73 MMHG | SYSTOLIC BLOOD PRESSURE: 147 MMHG | OXYGEN SATURATION: 99 % | HEART RATE: 86 BPM

## 2018-07-12 DIAGNOSIS — M79.606 LEG PAIN: Primary | ICD-10-CM

## 2018-07-12 DIAGNOSIS — T14.8XXA MUSCLE STRAIN: ICD-10-CM

## 2018-07-12 PROCEDURE — 99283 EMERGENCY DEPT VISIT LOW MDM: CPT

## 2018-07-12 PROCEDURE — 93971 EXTREMITY STUDY: CPT

## 2018-07-12 RX ORDER — IBUPROFEN 400 MG/1
800 TABLET ORAL ONCE
Status: COMPLETED | OUTPATIENT
Start: 2018-07-12 | End: 2018-07-12

## 2018-07-12 RX ADMIN — IBUPROFEN 800 MG: 400 TABLET ORAL at 19:15

## 2018-07-12 NOTE — DISCHARGE INSTRUCTIONS
Leg Pain   WHAT YOU NEED TO KNOW:   Leg pain may be caused by a variety of health conditions  Your tests did not show any broken bones or blood clots  DISCHARGE INSTRUCTIONS:   Return to the emergency department if:   · You have a fever  · Your leg starts to swell  · Your leg pain gets worse  · You have numbness or tingling in your leg or toes  · You cannot put any weight on or move your leg  Contact your healthcare provider if:   · Your pain does not decrease, even after treatment  · You have questions or concerns about your condition or care  Medicines:   · NSAIDs , such as ibuprofen, help decrease swelling, pain, and fever  This medicine is available with or without a doctor's order  NSAIDs can cause stomach bleeding or kidney problems in certain people  If you take blood thinner medicine, always ask your healthcare provider if NSAIDs are safe for you  Always read the medicine label and follow directions  · Take your medicine as directed  Contact your healthcare provider if you think your medicine is not helping or if you have side effects  Tell him of her if you are allergic to any medicine  Keep a list of the medicines, vitamins, and herbs you take  Include the amounts, and when and why you take them  Bring the list or the pill bottles to follow-up visits  Carry your medicine list with you in case of an emergency  Follow up with your healthcare provider as directed: You may need more tests to find the cause of your leg pain  You may need to see an orthopedic specialist or a physical therapist  Write down your questions so you remember to ask them during your visits  Manage your leg pain:   · Rest  your injured leg so that it can heal  You may need an immobilizer, brace, or splint to limit the movement of your leg  You may need to avoid putting any weight on your leg for at least 48 hours  Return to normal activities as directed      · Ice  the injury for 20 minutes every 4 hours for up to 24 hours, or as directed  Use an ice pack, or put crushed ice in a plastic bag  Cover it with a towel to protect your skin  Ice helps prevent tissue damage and decreases swelling and pain  · Elevate  your injured leg above the level of your heart as often as you can  This will help decrease swelling and pain  If possible, prop your leg on pillows or blankets to keep the area elevated comfortably  · Use assistive devices as directed  You may need to use a cane or crutches  Assistive devices help decrease pain and pressure on your leg when you walk  Ask your healthcare provider for more information about assistive devices and how to use them correctly  · Maintain a healthy weight  Extra body weight can cause pressure and pain in your hip, knee, and ankle joints  Ask your healthcare provider how much you should weigh  Ask him to help you create a weight loss plan if you are overweight  © 2017 [employer] Information is for End User's use only and may not be sold, redistributed or otherwise used for commercial purposes  All illustrations and images included in CareNotes® are the copyrighted property of [employer]  or Jerod Sylvester  The above information is an  only  It is not intended as medical advice for individual conditions or treatments  Talk to your doctor, nurse or pharmacist before following any medical regimen to see if it is safe and effective for you  Muscle Strain   WHAT YOU NEED TO KNOW:   What is a muscle strain? A muscle strain is a twist, pull, or tear of a muscle or tendon  A tendon is a strong elastic tissue that connects a muscle to a bone  What causes a muscle strain? Stretching a muscle too much may cause a muscle strain  A strain may also happen when a muscle is used too much without rest  Leg muscle strains are more common in people who play sports, run, dance, and water-ski   Strains in the muscles of the abdomen may happen when you play volleyball, tennis, golf, or baseball and when you dive  Low back muscle strains may occur when you lift heavy objects or when you wrestle or do gymnastics  What are the types of muscle strains? · A mild strain  is also called a first-degree strain  It is a tear of a few muscle fibers with little swelling  You may have very little or no loss of muscle strength  · A moderate strain  is also called a second-degree strain  There is more damage to your muscle or tendon, and it is weaker than it was before the injury  · A severe strain  is also called a third-degree strain  This tear goes along the whole length of the muscle, and you are unable to use the muscle at all  What increases my risk of a muscle strain? · Older age    · Muscle fatigue (tiredness)    · Not warming up before exercise    · Past muscle injury, or going back to your usual activity before your injury has healed    · Stiff, tight, and weak muscles    · Training longer or farther than your usual time or distance     · Problems with your feet, or your legs being different lengths  What are the signs and symptoms of a muscle strain? The signs and symptoms of a muscle strain depend on how badly your muscle is injured  The signs and symptoms may or may not show up right away when the injury happens  You may have one or more of the following:  · Bruised skin on the area of your injured muscle    · Muscle soreness, cramps, or spasms    · Little or stiff muscle movement, or loss of muscle strength    · Swelling in the area of the injury    · Muscle pain that gets worse with activity, or pain that moves or spreads to another body area    · Crepitus (crackling sound or grating feeling) when you move your muscle  How is a muscle strain diagnosed? Your healthcare provider will ask about diseases or injuries you have had in the past  He may touch and press parts of your muscle  He may bend, stretch, or move your joint certain ways   You may also have any of the following tests:  · X-ray: This is a picture of the bones and tissues in your body  X-rays may be done to make sure that you did not break a bone when your muscle strain happened  · Magnetic resonance imaging: This test is also called MRI  During the MRI, pictures of your muscles are taken  An MRI may be used to check for tears or other muscle injuries  It may also be used to look at your joints, bones, or blood vessels  You may be given dye through your vein before the pictures are taken  This helps your body parts show up better  Tell your healthcare provider if you are allergic to shellfish (lobster, crab, or shrimp)  People who are allergic to shellfish may also be allergic to some dyes  · Computed tomography scan: This is also called CT scan  A x-ray machine uses a computer to take pictures of your arms, legs, back, or abdomen  It is used to check for muscle injuries, broken bones, and damaged blood vessels  · Ultrasound: An ultrasound uses sound waves to show pictures of your muscles and tissues on a monitor  What can I do to help a muscle strain heal?   · 3 to 7 days after the injury:  Use Rest, Ice, Compression, and Elevation (RICE) to help stop bruising and decrease pain and swelling  ¨ Rest:  Rest your muscle to allow your injury to heal  When the pain decreases, begin normal, slow movements  For mild and moderate muscle strains, you should rest your muscles for about 2 days  However, if you have a severe muscle strain, you should rest for 10 to 14 days  You may need to use crutches to walk if your muscle strain is in your legs or lower body  ¨ Ice:  Put an ice pack on the injured area  Put a towel between the ice pack and your skin  Do not put the ice pack directly on your skin  You can use a package of frozen peas instead of an ice pack  ¨ Compression:  You may need to wrap an elastic bandage around the area to decrease swelling   It should be tight enough for you to feel support  Do not wrap it too tightly  ¨ Elevation:  Keep the injured muscle raised above your heart if possible  For example, if you have a strain of your lower leg muscle, lie down and prop your leg up on pillows  This helps decrease pain and swelling  · 3 to 21 days after your injury:  Start to slowly and regularly exercise your strained muscle  This will help it heal  If you feel pain, decrease how hard you are exercising  · 1 to 6 weeks after your injury:  Stretch the injured muscle  Stretch the muscle for about 30 seconds  Do this 4 times a day  You may stretch the muscle until you feel a slight pull  Stop stretching if you feel pain  · 2 weeks to 6 months after your injury:  The goal of this phase is to return to the activity you were doing before the injury without hurting the muscle again  · 3 weeks to 6 months after your injury:  Keep stretching and strengthening your muscles to avoid injury  Slowly increase the time and distance that you exercise  You may still have signs and symptoms of muscle strain 6 months after the injury, even if you do things to help it heal  In this case, you may need surgery on the muscle  How is a muscle strain treated? · Medicines:      ¨ NSAIDs:  This medicine is used to decrease pain and inflammation  It can be bought without a doctor's order  NSAIDs can cause stomach bleeding or kidney problems if they are not taken correctly  Always read the medicine label and follow the directions on it before you use this medicine  You should only use this medicine for 3 to 7 days after the injury happened  ¨ Muscle relaxers  help decrease pain and muscle spasms  ¨ Steroid medicines: Your healthcare provider may recommend a steroid injection to decrease pain and inflammation  ¨ Local anesthetic:  This can be used to numb the are for a short time  This is often used if you have a muscle strain in your back      · Physical therapy:  A physical therapist teaches you exercises to help improve movement and strength, and to decrease pain  · Surgery:  healthcare providers may recommend surgery if your muscle strain does not heal after 6 months of treatments  Surgery may be done to drain blood that has pooled in your muscle  If your tendon was torn off of the bone, it may be put back with surgery  How can a muscle strain be prevented? · Always wear proper shoes when you play sports:  Replace your old running shoes with new ones often if you are a runner  Use special shoe inserts or arch supports to correct leg or foot problems  Ask your healthcare provider for more information on shoe supports  · Do warm up and cool down exercises:  Do stretching exercises before you work out or do sports activities  These exercises will help loosen and decrease stress on your muscles  Cool down and stretch after your workout  Do not stop and rest after a workout without cooling down first            · Keep your muscles strong with strength training exercises:  Exercises such as weight lifting and stretching exercises help keep your muscles flexible and strong  A physical therapist or  may help you with these exercises  · Slowly start your exercise or sports training program:  Follow your healthcare provider's advice on when to start exercising  Slowly increase time, distance, and how often you train  Sudden increases in how often you train may cause you to injure your muscle again  When should I call my healthcare provider? · Your pain and swelling worsen or do not go away  · You have questions or concerns about your care or treatment  When should I seek immediate care? · You suddenly cannot feel or move your injured muscle  CARE AGREEMENT:   You have the right to help plan your care  Learn about your health condition and how it may be treated  Discuss treatment options with your caregivers to decide what care you want to receive   You always have the right to refuse treatment  The above information is an  only  It is not intended as medical advice for individual conditions or treatments  Talk to your doctor, nurse or pharmacist before following any medical regimen to see if it is safe and effective for you  © 2017 [employer] [employer] Information is for End User's use only and may not be sold, redistributed or otherwise used for commercial purposes  All illustrations and images included in CareNotes® are the copyrighted property of [employer]  or Jerod Sylvester

## 2018-07-12 NOTE — ED PROVIDER NOTES
History  Chief Complaint   Patient presents with    Leg Pain     Patient c/o pain behind left knee x 1 week  Now pain is worsening  Patient does have long car ride daily for work  Pain woke her up last night; now radiating to left foot  Patient presents to the emergency department for evaluation left lower extremity pain that began 1 week ago  She states she has pain in the posterior medial thigh and over the past week has been radiating down to the foot  She denies trauma fall or injury  She denies new activity  She does spend an extensive period of time commuting to work to and fro  Patient denies chest pain or sob  She denies history of pulmonary embolism or DVTs  She denies fever chills cough  Denies abdominal pain nausea vomiting diarrhea  Patient denies ill contacts  She states her appetite has been normal and she has been moving her bowels and urinating normally  He denies hip knee or ankle pain  She can ambulate without difficulty  Prior to Admission Medications   Prescriptions Last Dose Informant Patient Reported? Taking? Cholecalciferol (VITAMIN D3) 1000 units CAPS   Yes Yes   Sig: Take 2 capsules by mouth daily   SYNTHROID 137 MCG tablet   No Yes   Sig: TAKE 1 TABLET DAILY      Facility-Administered Medications: None       Past Medical History:   Diagnosis Date    Disease of thyroid gland     Endometriosis     Fibroids, subserous     Hypothyroidism        Past Surgical History:   Procedure Laterality Date    CHOLECYSTECTOMY      HYSTERECTOMY      TUBAL LIGATION         Family History   Problem Relation Age of Onset    Hypertension Father     Kidney disease Father     Heart disease Sister         CARDIAC DISORDER    Diabetes Sister         MELLITUS    Hypothyroidism Sister         HYPOTHYROIDISM, GOITROUS     I have reviewed and agree with the history as documented      Social History   Substance Use Topics    Smoking status: Former Smoker    Smokeless tobacco: Never Used      Comment: quit 2004    Alcohol use No      Comment: SOCIAL ALCOHOL USE AS PER [facility]        Review of Systems   Constitutional: Negative  Negative for activity change, appetite change, chills, diaphoresis and fatigue  HENT: Negative  Negative for congestion  Eyes: Negative  Respiratory: Negative  Negative for cough, choking, chest tightness, shortness of breath, wheezing and stridor  Cardiovascular: Negative  Negative for chest pain, palpitations and leg swelling  Gastrointestinal: Negative  Negative for abdominal pain, anal bleeding, blood in stool, constipation, diarrhea, nausea and vomiting  Endocrine: Negative  Genitourinary: Negative  Negative for difficulty urinating, dysuria, flank pain, frequency, hematuria, urgency, vaginal bleeding, vaginal discharge and vaginal pain  Musculoskeletal: Positive for arthralgias and myalgias  Negative for back pain, gait problem, joint swelling, neck pain and neck stiffness  Skin: Negative  Negative for rash and wound  Allergic/Immunologic: Negative  Neurological: Negative  Negative for dizziness, tremors, seizures, syncope, speech difficulty, weakness, light-headedness, numbness and headaches  Hematological: Negative  Does not bruise/bleed easily  Psychiatric/Behavioral: Negative  Negative for confusion  Physical Exam  Physical Exam   Constitutional: She is oriented to person, place, and time  She appears well-developed and well-nourished  HENT:   Head: Normocephalic and atraumatic  Right Ear: External ear normal    Left Ear: External ear normal    Mouth/Throat: Oropharynx is clear and moist    Eyes: Conjunctivae and EOM are normal  Pupils are equal, round, and reactive to light  Neck: Normal range of motion  Neck supple  Cardiovascular: Normal rate, regular rhythm, normal heart sounds and intact distal pulses  Pulmonary/Chest: Effort normal and breath sounds normal  No respiratory distress   She has no wheezes  She has no rales  She exhibits no tenderness  Abdominal: Soft  Bowel sounds are normal  She exhibits no distension and no mass  There is no tenderness  There is no rebound and no guarding  No hernia  Musculoskeletal: Normal range of motion  She exhibits tenderness  She exhibits no edema or deformity  Normal appearing left lower extremity  No obvious swelling bruising or signs of infection  Neurovascular status intact  There is tenderness the medial posterior thigh consistent with phlebitis  Normal range of motion at the hip knee and ankle  Neurological: She is alert and oriented to person, place, and time  She has normal reflexes  She displays normal reflexes  No cranial nerve deficit or sensory deficit  She exhibits normal muscle tone  Coordination normal    Skin: Skin is warm and dry  No rash noted  No erythema  No pallor  Psychiatric: She has a normal mood and affect  Her behavior is normal  Judgment and thought content normal    Nursing note and vitals reviewed        Vital Signs  ED Triage Vitals   Temperature Pulse Respirations Blood Pressure SpO2   07/12/18 1756 07/12/18 1754 07/12/18 1754 07/12/18 1754 07/12/18 1754   98 1 °F (36 7 °C) 86 20 147/73 99 %      Temp Source Heart Rate Source Patient Position - Orthostatic VS BP Location FiO2 (%)   07/12/18 1756 07/12/18 1754 07/12/18 1754 07/12/18 1754 --   Oral Monitor Sitting Left arm       Pain Score       07/12/18 1754       7           Vitals:    07/12/18 1754   BP: 147/73   Pulse: 86   Patient Position - Orthostatic VS: Sitting       Visual Acuity      ED Medications  Medications   ibuprofen (MOTRIN) tablet 800 mg (800 mg Oral Given 7/12/18 1915)       Diagnostic Studies  Results Reviewed     None                 VAS lower limb venous duplex study, unilateral/limited   ED Interpretation by Aristides Sullivan MD (07/12 1957)   No DVT as per Radiology                 Procedures  Procedures       Phone Contacts  ED Phone Contact    ED Course  ED Course as of Jul 12 1958   u Jul 12, 2018 1955 Patient is stable for discharge  Ultrasound shows no evidence of DVT or Baker cyst   Suspect muscular etiology  Discussed importance of returning if leg swells further for repeat ultrasound to rule out delayed DVT  MDM  CritCare Time    Disposition  Final diagnoses:   Leg pain   Muscle strain     Time reflects when diagnosis was documented in both MDM as applicable and the Disposition within this note     Time User Action Codes Description Comment    7/12/2018  7:56 PM Daysi Romero [M79 [id number]] Leg pain     7/12/2018  7:56 PM Garry Concepcion 56 Reda Peoples  8XXA] Muscle strain       ED Disposition     ED Disposition Condition Comment    Discharge  Zoya Adan discharge to home/self care  Condition at discharge: Stable        Follow-up Information     Follow up With Specialties Details [facility] [address] [place] physician  Schedule an appointment as soon as possible for a visit            Patient's Medications   Discharge Prescriptions    No medications on file     No discharge procedures on file      ED Provider  Electronically Signed by           Bhragavi Álvarez MD  07/12/18 7097

## 2018-07-13 PROCEDURE — 93971 EXTREMITY STUDY: CPT | Performed by: SURGERY

## 2018-07-25 ENCOUNTER — OFFICE VISIT (OUTPATIENT)
Dept: FAMILY MEDICINE CLINIC | Facility: CLINIC | Age: 53
End: 2018-07-25
Payer: COMMERCIAL

## 2018-07-25 VITALS
HEART RATE: 80 BPM | DIASTOLIC BLOOD PRESSURE: 68 MMHG | BODY MASS INDEX: 30.62 KG/M2 | TEMPERATURE: 97.7 F | RESPIRATION RATE: 20 BRPM | HEIGHT: 68 IN | WEIGHT: 202 LBS | SYSTOLIC BLOOD PRESSURE: 112 MMHG

## 2018-07-25 DIAGNOSIS — Z12.31 SCREENING MAMMOGRAM, ENCOUNTER FOR: ICD-10-CM

## 2018-07-25 DIAGNOSIS — M54.42 ACUTE LEFT-SIDED LOW BACK PAIN WITH LEFT-SIDED SCIATICA: Primary | ICD-10-CM

## 2018-07-25 PROCEDURE — 3008F BODY MASS INDEX DOCD: CPT | Performed by: FAMILY MEDICINE

## 2018-07-25 PROCEDURE — 99214 OFFICE O/P EST MOD 30 MIN: CPT | Performed by: FAMILY MEDICINE

## 2018-07-25 RX ORDER — CYCLOBENZAPRINE HCL 10 MG
10 TABLET ORAL
Qty: 30 TABLET | Refills: 0 | Status: SHIPPED | OUTPATIENT
Start: 2018-07-25 | End: 2018-10-12

## 2018-07-25 RX ORDER — MELOXICAM 15 MG/1
15 TABLET ORAL DAILY
Qty: 30 TABLET | Refills: 2 | Status: SHIPPED | OUTPATIENT
Start: 2018-07-25 | End: 2018-10-12

## 2018-07-25 NOTE — PROGRESS NOTES
Assessment/Plan:    Discussed diagnostic and treatment options with patient  Patient is being referred for physical therapy evaluation and treatment  Patient will be started on meloxicam 15 mg 1 daily with food and instructed to discontinue Aleve and all other NSAIDs  Patient will be started on cyclobenzaprine 10 mg 1 q h s  p r n , caution regarding drowsiness  Patient instructed to apply ice alternating with heat for 20 minutes each 3-4 times daily  Recommend rest   Return to the office in 2-3 weeks or sooner p r n     Discussed x-ray and/or MRI if symptoms persist      Diagnoses and all orders for this visit:    Acute left-sided low back pain with left-sided sciatica  Comments:  Referral for physical therapy  Meloxicam 15 mg 1 daily with food and cyclobenzaprine 10 mg 1 q h s  p r n    Orders:  -     meloxicam (MOBIC) 15 mg tablet; Take 1 tablet (15 mg total) by mouth daily  -     cyclobenzaprine (FLEXERIL) 10 mg tablet; Take 1 tablet (10 mg total) by mouth daily at bedtime as needed for muscle spasms  -     Ambulatory referral to Physical Therapy; Future    Screening mammogram, encounter for  -     Mammo screening bilateral w 3d & cad; Future          Subjective:      Patient ID: Ghanshyam Lorenzo is a 45 y o  female  Patient is being seen in follow-up from emergency room visit at [facility] 2 weeks ago for left leg pain  Patient had venous duplex which was negative for DVT  Patient complains of left-sided low back pain with pain radiating into the left buttocks, left thigh and into the left foot intermittently for the past few weeks  She admits to numbness and tingling and weakness in her leg  Patient denies any specific injury or fall  Patient has a 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  Patient denies any bowel or bladder problems  She has treated this with Aleve and heating pad without significant relief    Patient had attended physical therapy in the past for her back with good results  Leg Pain    There was no injury mechanism  The pain is present in the left leg  The quality of the pain is described as stabbing  The pain has been constant since onset  Associated symptoms include muscle weakness, numbness and tingling  Pertinent negatives include no inability to bear weight  The symptoms are aggravated by weight bearing and movement  She has tried heat, NSAIDs and rest for the symptoms  The treatment provided mild relief  Back Pain   This is a new problem  The current episode started 1 to 4 weeks ago  The problem is unchanged  The pain is present in the lumbar spine  The quality of the pain is described as aching  The pain radiates to the left thigh and left foot  The pain is the same all the time  The symptoms are aggravated by bending, twisting and standing  Stiffness is present in the morning  Associated symptoms include leg pain, numbness and tingling  Pertinent negatives include no bladder incontinence, bowel incontinence or dysuria  The following portions of the patient's history were reviewed and updated as appropriate: allergies, current medications, past family history, past medical history, past social history, past surgical history and problem list     Review of Systems   Gastrointestinal: Negative for bowel incontinence  Genitourinary: Negative for bladder incontinence and dysuria  Musculoskeletal: Positive for back pain  Neurological: Positive for tingling and numbness  Objective:      /68 (BP Location: Left arm, Patient Position: Sitting, Cuff Size: Large)   Pulse 80   Temp 97 7 °F (36 5 °C) (Tympanic)   Resp 20   Ht 5' 8" (1 727 m)   Wt 91 6 kg (202 lb)   BMI 30 71 kg/m²          Physical Exam   Constitutional: She is oriented to person, place, and time  She appears well-developed and well-nourished  No distress  HENT:   Head: Normocephalic     Mouth/Throat: Oropharynx is clear and moist    Eyes: Conjunctivae are normal  No scleral icterus  Neck: Neck supple  Cardiovascular: Normal rate and regular rhythm  Pulmonary/Chest: Effort normal and breath sounds normal    Abdominal: Soft  There is no tenderness  Musculoskeletal: She exhibits tenderness  She exhibits no edema  Positive left lumbosacral tenderness  Positive left straight leg raise  Lower extremity strength and DTRs intact  Negative calf tenderness and negative Kavita's sign bilaterally  Lymphadenopathy:     She has no cervical adenopathy  Neurological: She is alert and oriented to person, place, and time  She has normal reflexes  Skin: Skin is warm and dry  Psychiatric: She has a normal mood and affect

## 2018-08-03 ENCOUNTER — HOSPITAL ENCOUNTER (OUTPATIENT)
Dept: RADIOLOGY | Age: 53
Discharge: HOME | End: 2018-08-03
Payer: COMMERCIAL

## 2018-08-03 DIAGNOSIS — Z12.31 SCREENING MAMMOGRAM, ENCOUNTER FOR: ICD-10-CM

## 2018-08-03 PROCEDURE — 77063 BREAST TOMOSYNTHESIS BI: CPT

## 2018-08-03 PROCEDURE — 77067 SCR MAMMO BI INCL CAD: CPT

## 2018-08-06 NOTE — PROGRESS NOTES
Please call patient and inform them that mammogram is normal   Recommend recheck mammogram in 1 year 
Discharged

## 2018-08-07 ENCOUNTER — TELEPHONE (OUTPATIENT)
Dept: FAMILY MEDICINE CLINIC | Facility: CLINIC | Age: 53
End: 2018-08-07

## 2018-08-07 NOTE — TELEPHONE ENCOUNTER
Patient request a letter to return back to work she is asking to go back on Thursday 8/9/2018   plz advise

## 2018-08-07 NOTE — TELEPHONE ENCOUNTER
She will need to be seen to be evaluated if she needs a medical letter clearing her to return to work

## 2018-10-10 NOTE — TELEPHONE ENCOUNTER
Pt is going on a cruise in 2 weeks  You have rx'd this before in 2015 and 2017       Please send to [facility] pharmacy [phone] present

## 2018-10-12 ENCOUNTER — OFFICE VISIT (OUTPATIENT)
Dept: FAMILY MEDICINE CLINIC | Facility: CLINIC | Age: 53
End: 2018-10-12
Payer: COMMERCIAL

## 2018-10-12 VITALS
OXYGEN SATURATION: 98 % | BODY MASS INDEX: 30.77 KG/M2 | SYSTOLIC BLOOD PRESSURE: 108 MMHG | DIASTOLIC BLOOD PRESSURE: 68 MMHG | TEMPERATURE: 97.6 F | HEIGHT: 68 IN | HEART RATE: 80 BPM | RESPIRATION RATE: 16 BRPM | WEIGHT: 203 LBS

## 2018-10-12 DIAGNOSIS — S39.011A ABDOMINAL WALL STRAIN, INITIAL ENCOUNTER: Primary | ICD-10-CM

## 2018-10-12 DIAGNOSIS — E03.9 HYPOTHYROIDISM, UNSPECIFIED TYPE: ICD-10-CM

## 2018-10-12 DIAGNOSIS — F41.9 ANXIETY: ICD-10-CM

## 2018-10-12 PROCEDURE — 99213 OFFICE O/P EST LOW 20 MIN: CPT | Performed by: FAMILY MEDICINE

## 2018-10-12 RX ORDER — LEVOTHYROXINE SODIUM 137 MCG
137 TABLET ORAL DAILY
Qty: 90 TABLET | Refills: 1 | Status: SHIPPED | OUTPATIENT
Start: 2018-10-12 | End: 2019-01-15

## 2018-10-12 RX ORDER — ALPRAZOLAM 0.25 MG/1
0.25 TABLET ORAL
Qty: 30 TABLET | Refills: 0 | Status: SHIPPED | OUTPATIENT
Start: 2018-10-12 | End: 2019-01-15 | Stop reason: ALTCHOICE

## 2018-10-12 RX ORDER — MELOXICAM 7.5 MG/1
7.5 TABLET ORAL DAILY
Qty: 30 TABLET | Refills: 0 | Status: SHIPPED | OUTPATIENT
Start: 2018-10-12 | End: 2018-10-31

## 2018-10-12 NOTE — PROGRESS NOTES
Assessment/Plan:    Discussed treatment options with patient  Patient was started on meloxicam 7 5 milligrams 1 b i d  with food  She may apply ice alternating with heat for 20 minutes each 3-4 times daily  Patient was started on alprazolam 0 25 milligrams q h s  p r n  Kim Barrera Return to the office 1 week or sooner p r henry Barrera Diagnoses and all orders for this visit:    Abdominal wall strain, initial encounter  Comments:  Meloxicam 7 5 milligrams 1 b i d  with food  Apply ice alternating with heat p r n   Orders:  -     meloxicam (MOBIC) 7 5 mg tablet; Take 1 tablet (7 5 mg total) by mouth daily    Anxiety  Comments:  Alprazolam 0 25 milligrams 1 q h s  p r n   Orders:  -     ALPRAZolam (XANAX) 0 25 mg tablet; Take 1 tablet (0 25 mg total) by mouth daily at bedtime as needed for anxiety    Hypothyroidism, unspecified type  -     SYNTHROID 137 MCG tablet; Take 1 tablet (137 mcg total) by mouth daily          Subjective:      Patient ID: Lizbeth Ramsey is a 52 y o  female  Past 2 days patient complains of left upper quadrant abdominal and lower chest wall pain  She denies any specific injury or fall  She admits to pain with twisting and turning  Patient denies any rash  Appetite remains good and patient denies any nausea or vomiting  She denies any indigestion or heartburn  No change in bowel movements  Patient treated this with aspirin with some relief  Patient also admits to increased anxiety and difficulty sleeping at night  Patient was laid off from work 2 months ago and her  was just laid off from work 2 weeks ago  Abdominal Pain   This is a new problem  The current episode started in the past 7 days  The problem occurs intermittently  The problem has been unchanged  The pain is located in the LUQ  The quality of the pain is dull  The abdominal pain does not radiate  Associated symptoms include diarrhea   Pertinent negatives include no anorexia, belching, constipation, dysuria, fever, hematochezia, hematuria, melena, nausea, vomiting or weight loss  The pain is aggravated by certain positions and movement  Treatments tried: asa,vicks rub  The treatment provided mild relief  Her past medical history is significant for abdominal surgery, gallstones and GERD  There is no history of Crohn's disease, irritable bowel syndrome, pancreatitis, PUD or ulcerative colitis  The following portions of the patient's history were reviewed and updated as appropriate: allergies, current medications, past family history, past medical history, past social history, past surgical history and problem list     Review of Systems   Constitutional: Negative for fever and weight loss  Gastrointestinal: Positive for abdominal pain and diarrhea  Negative for anorexia, constipation, hematochezia, melena, nausea and vomiting  Genitourinary: Negative for dysuria and hematuria  Objective:      /68 (BP Location: Left arm, Patient Position: Sitting, Cuff Size: Large)   Pulse 80   Temp 97 6 °F (36 4 °C) (Tympanic)   Ht 5' 8 27" (1 734 m)   Wt 92 1 kg (203 lb)   SpO2 98%   BMI 30 62 kg/m²          Physical Exam   Constitutional: She is oriented to person, place, and time  She appears well-developed and well-nourished  No distress  HENT:   Head: Normocephalic  Mouth/Throat: Oropharynx is clear and moist    Eyes: Conjunctivae are normal  No scleral icterus  Neck: Neck supple  Cardiovascular: Normal rate and regular rhythm  Pulmonary/Chest: Effort normal and breath sounds normal    Abdominal: Soft  Bowel sounds are normal  There is no tenderness  Musculoskeletal: She exhibits tenderness  She exhibits no edema  Mild left upper quadrant abdominal wall and left lower chest wall tenderness to palpation  Negative rash or ecchymosis  Lymphadenopathy:     She has no cervical adenopathy  Neurological: She is alert and oriented to person, place, and time  Skin: Skin is warm and dry  Psychiatric: She has a normal mood and affect

## 2018-10-17 ENCOUNTER — ANNUAL EXAM (OUTPATIENT)
Dept: OBGYN CLINIC | Facility: CLINIC | Age: 53
End: 2018-10-17
Payer: COMMERCIAL

## 2018-10-17 VITALS
HEIGHT: 67 IN | DIASTOLIC BLOOD PRESSURE: 70 MMHG | BODY MASS INDEX: 31.36 KG/M2 | SYSTOLIC BLOOD PRESSURE: 104 MMHG | WEIGHT: 199.8 LBS

## 2018-10-17 DIAGNOSIS — N95.1 MENOPAUSAL SYNDROME (HOT FLASHES): ICD-10-CM

## 2018-10-17 DIAGNOSIS — N95.1 MENOPAUSAL VAGINAL DRYNESS: ICD-10-CM

## 2018-10-17 DIAGNOSIS — Z01.419 WOMEN'S ANNUAL ROUTINE GYNECOLOGICAL EXAMINATION: Primary | ICD-10-CM

## 2018-10-17 DIAGNOSIS — Z90.710 STATUS POST LAPAROSCOPIC HYSTERECTOMY: ICD-10-CM

## 2018-10-17 DIAGNOSIS — Z90.721 STATUS POST LEFT OOPHORECTOMY: ICD-10-CM

## 2018-10-17 DIAGNOSIS — R10.2 PELVIC PAIN IN FEMALE: ICD-10-CM

## 2018-10-17 DIAGNOSIS — E03.9 HYPOTHYROIDISM (ACQUIRED): ICD-10-CM

## 2018-10-17 DIAGNOSIS — Z11.51 SCREENING FOR HUMAN PAPILLOMAVIRUS (HPV): ICD-10-CM

## 2018-10-17 PROCEDURE — G0145 SCR C/V CYTO,THINLAYER,RESCR: HCPCS | Performed by: OBSTETRICS & GYNECOLOGY

## 2018-10-17 PROCEDURE — S0612 ANNUAL GYNECOLOGICAL EXAMINA: HCPCS | Performed by: OBSTETRICS & GYNECOLOGY

## 2018-10-17 PROCEDURE — 87624 HPV HI-RISK TYP POOLED RSLT: CPT | Performed by: OBSTETRICS & GYNECOLOGY

## 2018-10-17 NOTE — PROGRESS NOTES
Assessment   Annual well-woman exam  Status post total laparoscopic hysterectomy with left oophorectomy in   History of uterine fibroids  History of menorrhagia  Recurrent pelvic pain symptoms  Vaginal dryness  Menopausal syndrome  Plan   Pelvic ultrasound to evaluate right ovary and pelvic pain symptoms  New start Premarin vaginal cream twice weekly at bedtime  Return in 2 weeks to evaluate test results  All questions answered  Await pap smear results  Subjective recurrent pelvic pain symptoms here for an exam     Shon Wilkinson is a 52 y o  female  1 para 1 who presents for annual exam    She did have a total laparoscopic hysterectomy with left salpingo-oophorectomy in   At that time she was having symptoms of menorrhagia secondary to large uterine fibroids  Since that time she has improved and has been stable  She has had progressive symptoms of hot flashes and night sweats and also vaginal dryness symptoms  Currently she uses coconut oil as a lubricant  We did discussed options of treatment she is going to try topical estrogen twice weekly as prescribed  She no longer has menstrual cycles since her hysterectomy she does however have recurrent pelvic pain symptoms  She does have 1 ovary remaining recommend we check a screening pelvic ultrasound to evaluate this  The patient reports that there is not domestic violence in her life  Current contraception: status post hysterectomy  History of abnormal Pap smear: no  Family history of uterine or ovarian cancer: no  Regular self breast exam: yes  History of abnormal mammogram: no  Family history of breast cancer: no  History of abnormal lipids: no  Menstrual History:  OB History     No data available         Menarche age: 13  No LMP recorded  Patient has had a hysterectomy  Review of Systems  Pertinent items are noted in HPI        Objective no acute distress   /70 (BP Location: Right arm, Patient Position: Sitting, Cuff Size: Standard)   Ht 5' 7" (1 702 m)   Wt 90 6 kg (199 lb 12 8 oz)   BMI 31 29 kg/m²     General:   alert and oriented, in no acute distress, alert, appears stated age and cooperative   Heart: regular rate and rhythm, S1, S2 normal, no murmur, click, rub or gallop   Lungs: clear to auscultation bilaterally   Abdomen: soft, non-tender, without masses or organomegaly   Vulva: normal, Bartholin's, Urethra, Kerkhoven's normal, female escutcheon   Vagina: For shortened vagina diminished rugae tight 2 finger exam   Cervix: surgically absent   Uterus: surgically absent   Adnexa: Left ovary surgically absent right-sided pelvic pain fullness without mass detected   Rectal exam no rectal masses appreciated Hemoccult negative  Breast exam bilateral breast exam in the sitting supine position with chaperone present no visible palpable breast lesions identified no supraclavicular axillary lymphadenopathy noted no nipple discharge  Reviewed self-breast exam technique

## 2018-10-18 ENCOUNTER — CLINICAL SUPPORT (OUTPATIENT)
Dept: FAMILY MEDICINE CLINIC | Facility: CLINIC | Age: 53
End: 2018-10-18
Payer: COMMERCIAL

## 2018-10-18 DIAGNOSIS — Z23 NEED FOR INFLUENZA VACCINATION: Primary | ICD-10-CM

## 2018-10-18 PROCEDURE — 90682 RIV4 VACC RECOMBINANT DNA IM: CPT

## 2018-10-18 PROCEDURE — 90471 IMMUNIZATION ADMIN: CPT

## 2018-10-18 NOTE — PROGRESS NOTES
Patient presents to office today for Influenza Vaccine  FluBlok 0 5 mL given in R Deltoid  Patient tolerated well

## 2018-10-19 LAB
HPV HR 12 DNA CVX QL NAA+PROBE: NEGATIVE
HPV16 DNA CVX QL NAA+PROBE: NEGATIVE
HPV18 DNA CVX QL NAA+PROBE: NEGATIVE

## 2018-10-22 LAB
LAB AP GYN PRIMARY INTERPRETATION: NORMAL
Lab: NORMAL

## 2018-10-24 ENCOUNTER — HOSPITAL ENCOUNTER (OUTPATIENT)
Dept: ULTRASOUND IMAGING | Facility: HOSPITAL | Age: 53
Discharge: HOME | End: 2018-10-24
Payer: COMMERCIAL

## 2018-10-24 DIAGNOSIS — R10.2 PELVIC PAIN IN FEMALE: ICD-10-CM

## 2018-10-24 PROCEDURE — 76856 US EXAM PELVIC COMPLETE: CPT

## 2018-10-24 PROCEDURE — 76830 TRANSVAGINAL US NON-OB: CPT

## 2018-10-31 ENCOUNTER — OFFICE VISIT (OUTPATIENT)
Dept: OBGYN CLINIC | Facility: CLINIC | Age: 53
End: 2018-10-31
Payer: COMMERCIAL

## 2018-10-31 VITALS
WEIGHT: 201.4 LBS | SYSTOLIC BLOOD PRESSURE: 118 MMHG | DIASTOLIC BLOOD PRESSURE: 62 MMHG | HEIGHT: 68 IN | BODY MASS INDEX: 30.52 KG/M2

## 2018-10-31 DIAGNOSIS — Z90.710 STATUS POST LAPAROSCOPIC HYSTERECTOMY: ICD-10-CM

## 2018-10-31 DIAGNOSIS — Z90.721 STATUS POST LEFT OOPHORECTOMY: ICD-10-CM

## 2018-10-31 DIAGNOSIS — N89.8 VAGINAL DRYNESS: ICD-10-CM

## 2018-10-31 DIAGNOSIS — Z90.79 STATUS POST BILATERAL SALPINGECTOMY: ICD-10-CM

## 2018-10-31 DIAGNOSIS — M54.50 BACK PAIN AT L4-L5 LEVEL: ICD-10-CM

## 2018-10-31 DIAGNOSIS — R10.2 PELVIC PAIN IN FEMALE: Primary | ICD-10-CM

## 2018-10-31 PROCEDURE — 99213 OFFICE O/P EST LOW 20 MIN: CPT | Performed by: OBSTETRICS & GYNECOLOGY

## 2018-10-31 RX ORDER — PREDNISONE 1 MG/1
TABLET ORAL
Refills: 1 | COMMUNITY
Start: 2018-10-13 | End: 2019-01-15 | Stop reason: ALTCHOICE

## 2018-10-31 NOTE — PROGRESS NOTES
Assessment/Plan:    Diagnoses and all orders for this visit:    Pelvic pain in female    Back pain at L4-L5 level    Status post laparoscopic hysterectomy    Status post bilateral salpingectomy    Status post left oophorectomy    Vaginal dryness    Other orders  -     predniSONE 5 mg tablet; TAKE 1 TABLET BY MOUTH TWICE A DAY WITH FOOD FOR NERVE INFLAMMATION,HOLD MOBIC WHILE TAKING IT        Subjective:  Here for test results     Patient ID: Kelly Vences is a 52 y o  female  HPI   55-year-old female  1 para 1 recently here for annual exam Pap pelvic and breast exam   Patient did have a total laparoscopic hysterectomy with left salpingo-oophorectomy in   Since that time she has been feeling well she does have intermittent pelvic pain symptoms and history of low back pain  She does have some occasional hot flashes and night sweats and vaginal dryness  She declined hormonal supplementation for constitutional symptoms  She was given a prescription for vaginal cream however co-pay was too high she has not had a filled yet  Pelvic ultrasound was done recently and showed evidence of hysterectomy and oophorectomy  There was no pelvic pathology identified on her pelvic ultrasound  Her recent vaginal Pap was normal and negative for HPV  She does state her pelvic pain is feeling somewhat better these days  Recommended NSAIDs for her low back pain or follow-up with her neurologist or orthopedist regarding those symptoms  All questions answered follow-up and annual exam and p r n       Review of Systems   All other systems reviewed and are negative  Objective:  /62 height 5 foot 8 weight 201 lb, no acute distress     Physical Exam   Constitutional: She is oriented to person, place, and time  She appears well-developed and well-nourished  HENT:   Head: Normocephalic and atraumatic  Eyes: Pupils are equal, round, and reactive to light  Neck: Normal range of motion     Genitourinary: Genitourinary Comments: Pelvic exam deferred   Musculoskeletal: Normal range of motion  Neurological: She is alert and oriented to person, place, and time  Skin: Skin is warm and dry  Psychiatric: She has a normal mood and affect

## 2018-11-08 DIAGNOSIS — S39.011A ABDOMINAL WALL STRAIN, INITIAL ENCOUNTER: ICD-10-CM

## 2018-11-08 RX ORDER — MELOXICAM 7.5 MG/1
TABLET ORAL
Qty: 30 TABLET | Refills: 0 | Status: SHIPPED | OUTPATIENT
Start: 2018-11-08 | End: 2019-01-15 | Stop reason: ALTCHOICE

## 2019-01-15 ENCOUNTER — OFFICE VISIT (OUTPATIENT)
Dept: FAMILY MEDICINE CLINIC | Facility: CLINIC | Age: 54
End: 2019-01-15
Payer: COMMERCIAL

## 2019-01-15 VITALS
SYSTOLIC BLOOD PRESSURE: 110 MMHG | TEMPERATURE: 97.1 F | DIASTOLIC BLOOD PRESSURE: 68 MMHG | HEART RATE: 72 BPM | WEIGHT: 202 LBS | BODY MASS INDEX: 30.62 KG/M2 | HEIGHT: 68 IN | RESPIRATION RATE: 16 BRPM

## 2019-01-15 DIAGNOSIS — J02.9 PHARYNGITIS, UNSPECIFIED ETIOLOGY: Primary | ICD-10-CM

## 2019-01-15 DIAGNOSIS — T75.3XXA MOTION SICKNESS, INITIAL ENCOUNTER: ICD-10-CM

## 2019-01-15 DIAGNOSIS — E03.9 HYPOTHYROIDISM, UNSPECIFIED TYPE: ICD-10-CM

## 2019-01-15 LAB — S PYO AG THROAT QL: NEGATIVE

## 2019-01-15 PROCEDURE — 3008F BODY MASS INDEX DOCD: CPT | Performed by: FAMILY MEDICINE

## 2019-01-15 PROCEDURE — 87880 STREP A ASSAY W/OPTIC: CPT | Performed by: FAMILY MEDICINE

## 2019-01-15 PROCEDURE — 99213 OFFICE O/P EST LOW 20 MIN: CPT | Performed by: FAMILY MEDICINE

## 2019-01-15 RX ORDER — MECLIZINE HYDROCHLORIDE 25 MG/1
25 TABLET ORAL EVERY 8 HOURS PRN
Qty: 30 TABLET | Refills: 0 | Status: SHIPPED | OUTPATIENT
Start: 2019-01-15 | End: 2019-09-18 | Stop reason: ALTCHOICE

## 2019-01-15 RX ORDER — CEFUROXIME AXETIL 250 MG/1
250 TABLET ORAL EVERY 12 HOURS SCHEDULED
Qty: 20 TABLET | Refills: 0 | Status: SHIPPED | OUTPATIENT
Start: 2019-01-15 | End: 2019-01-25

## 2019-01-15 RX ORDER — LEVOTHYROXINE SODIUM 137 UG/1
137 TABLET ORAL DAILY
Qty: 90 TABLET | Refills: 3 | Status: SHIPPED | OUTPATIENT
Start: 2019-01-15 | End: 2019-09-18

## 2019-01-15 NOTE — PROGRESS NOTES
Assessment/Plan:  Rapid strep test is negative  Patient was started on Ceftin 250 mg 1 b i d  For 10 days  Patient may take Tylenol or Motrin p r n  Vin[place] Simple She is encouraged to drink plenty of fluids and rest   Return to the office in 1 week or sooner p r n  [place] [facility] Diagnoses and all orders for this visit:    Pharyngitis, unspecified etiology  Comments:  Rapid strep test negative  Ceftin 250 mg 1 b i d  times 10 days  Tylenol or Motrin p r n   Increase fluids and rest   Orders:  -     POCT rapid strepA  -     cefuroxime (CEFTIN) 250 mg tablet; Take 1 tablet (250 mg total) by mouth every 12 (twelve) hours for 10 days    Hypothyroidism, unspecified type  -     levothyroxine 137 mcg tablet; Take 1 tablet (137 mcg total) by mouth daily    Motion sickness, initial encounter  -     meclizine (ANTIVERT) 25 mg tablet; Take 1 tablet (25 mg total) by mouth every 8 (eight) hours as needed for dizziness          Subjective:      Patient ID: Nicholette Pallas is a 52 y o  female  Patient started 2 days ago with sore throat and swollen gland on the left side of her neck  She admits to headache, earache and body aches  Patient denies fever or cough  She has treated this with Sarah Ray URI    This is a new problem  The current episode started in the past 7 days  The problem has been gradually worsening  There has been no fever  Associated symptoms include ear pain, headaches, neck pain, rhinorrhea, a sore throat and swollen glands  Pertinent negatives include no congestion, coughing, diarrhea, nausea, plugged ear sensation, rash, sinus pain, sneezing, vomiting or wheezing  Treatments tried: jemal seltzer pm  The treatment provided mild relief         The following portions of the patient's history were reviewed and updated as appropriate: allergies, current medications, past family history, past medical history, past social history, past surgical history and problem list     Review of Systems   HENT: Positive for ear pain, rhinorrhea and sore throat  Negative for congestion, sinus pain and sneezing  Respiratory: Negative for cough and wheezing  Gastrointestinal: Negative for diarrhea, nausea and vomiting  Musculoskeletal: Positive for neck pain  Skin: Negative for rash  Neurological: Positive for headaches  Objective:      /68 (BP Location: Left arm, Patient Position: Sitting, Cuff Size: Standard)   Pulse 72   Temp (!) 97 1 °F (36 2 °C) (Tympanic)   Resp 16   Ht 5' 8" (1 727 m)   Wt 91 6 kg (202 lb)   BMI 30 71 kg/m²          Physical Exam   Constitutional: She is oriented to person, place, and time  She appears well-developed and well-nourished  No distress  HENT:   Head: Normocephalic  Right Ear: External ear normal    Left Ear: External ear normal    Nose: Nose normal    Mouth/Throat: No oropharyngeal exudate  Throat positive erythema  Mucous membranes moist    Eyes: Conjunctivae are normal  No scleral icterus  Neck: Neck supple  Positive left anterior cervical node  Cardiovascular: Normal rate and regular rhythm  Pulmonary/Chest: Effort normal and breath sounds normal    Abdominal: Soft  There is no tenderness  Musculoskeletal: She exhibits no edema  Lymphadenopathy:     She has cervical adenopathy  Neurological: She is alert and oriented to person, place, and time  Skin: Skin is warm and dry  Psychiatric: She has a normal mood and affect

## 2019-02-26 ENCOUNTER — APPOINTMENT (OUTPATIENT)
Dept: LAB | Age: 54
End: 2019-02-26
Attending: PREVENTIVE MEDICINE

## 2019-02-26 ENCOUNTER — TRANSCRIBE ORDERS (OUTPATIENT)
Dept: ADMINISTRATIVE | Age: 54
End: 2019-02-26

## 2019-02-26 DIAGNOSIS — Z01.84 IMMUNITY STATUS TESTING: Primary | ICD-10-CM

## 2019-02-26 DIAGNOSIS — Z01.84 IMMUNITY STATUS TESTING: ICD-10-CM

## 2019-02-26 LAB — RUBV IGG SERPL IA-ACNC: >175 IU/ML

## 2019-02-26 PROCEDURE — 86480 TB TEST CELL IMMUN MEASURE: CPT

## 2019-02-26 PROCEDURE — 86762 RUBELLA ANTIBODY: CPT

## 2019-02-26 PROCEDURE — 86787 VARICELLA-ZOSTER ANTIBODY: CPT

## 2019-02-26 PROCEDURE — 36415 COLL VENOUS BLD VENIPUNCTURE: CPT

## 2019-02-26 PROCEDURE — 86735 MUMPS ANTIBODY: CPT

## 2019-02-26 PROCEDURE — 86765 RUBEOLA ANTIBODY: CPT

## 2019-02-27 LAB
GAMMA INTERFERON BACKGROUND BLD IA-ACNC: 0.04 IU/ML
M TB IFN-G BLD-IMP: NEGATIVE
M TB IFN-G CD4+ BCKGRND COR BLD-ACNC: -0.01 IU/ML
M TB IFN-G CD4+ BCKGRND COR BLD-ACNC: 0 IU/ML
MITOGEN IGNF BCKGRD COR BLD-ACNC: >10 IU/ML

## 2019-02-28 LAB
MEV IGG SER QL: NORMAL
MUV IGG SER QL: NORMAL
VZV IGG SER IA-ACNC: NORMAL

## 2019-03-13 ENCOUNTER — TELEPHONE (OUTPATIENT)
Dept: FAMILY MEDICINE CLINIC | Facility: CLINIC | Age: 54
End: 2019-03-13

## 2019-03-13 NOTE — TELEPHONE ENCOUNTER
Pt stated her Hep B series approximately 1 week ago for her employment  However she has since left that employer and she wants to know if she needs to continue the series?

## 2019-03-13 NOTE — TELEPHONE ENCOUNTER
Pt is agreeable to continue  She stated her second dose was scheduled for the 26th, can you put an order in the system so she can finish the series here?

## 2019-03-13 NOTE — TELEPHONE ENCOUNTER
Call patient and inform her that she should continue the hepatitis B series although she does not have to

## 2019-03-19 ENCOUNTER — TELEPHONE (OUTPATIENT)
Dept: FAMILY MEDICINE CLINIC | Facility: CLINIC | Age: 54
End: 2019-03-19

## 2019-03-19 NOTE — TELEPHONE ENCOUNTER
Call patient and inform her 2nd dose of vaccine should be given 1 month after the 1st dose  Okay to come in 1 day early

## 2019-03-26 DIAGNOSIS — T75.3XXA MOTION SICKNESS, INITIAL ENCOUNTER: ICD-10-CM

## 2019-03-26 RX ORDER — MECLIZINE HYDROCHLORIDE 25 MG/1
25 TABLET ORAL EVERY 8 HOURS PRN
Qty: 30 TABLET | Refills: 0 | OUTPATIENT
Start: 2019-03-26

## 2019-05-20 ENCOUNTER — OFFICE VISIT (OUTPATIENT)
Dept: FAMILY MEDICINE CLINIC | Facility: CLINIC | Age: 54
End: 2019-05-20
Payer: COMMERCIAL

## 2019-05-20 VITALS
HEIGHT: 68 IN | BODY MASS INDEX: 30.46 KG/M2 | RESPIRATION RATE: 16 BRPM | TEMPERATURE: 97.5 F | SYSTOLIC BLOOD PRESSURE: 108 MMHG | WEIGHT: 201 LBS | HEART RATE: 72 BPM | DIASTOLIC BLOOD PRESSURE: 78 MMHG

## 2019-05-20 DIAGNOSIS — Z02.1 PHYSICAL EXAM, PRE-EMPLOYMENT: Primary | ICD-10-CM

## 2019-05-20 PROCEDURE — 99213 OFFICE O/P EST LOW 20 MIN: CPT | Performed by: FAMILY MEDICINE

## 2019-05-20 PROCEDURE — 3008F BODY MASS INDEX DOCD: CPT | Performed by: FAMILY MEDICINE

## 2019-05-20 PROCEDURE — 1036F TOBACCO NON-USER: CPT | Performed by: FAMILY MEDICINE

## 2019-09-18 ENCOUNTER — OFFICE VISIT (OUTPATIENT)
Dept: FAMILY MEDICINE CLINIC | Facility: CLINIC | Age: 54
End: 2019-09-18
Payer: COMMERCIAL

## 2019-09-18 VITALS
WEIGHT: 197 LBS | SYSTOLIC BLOOD PRESSURE: 104 MMHG | HEIGHT: 68 IN | DIASTOLIC BLOOD PRESSURE: 72 MMHG | TEMPERATURE: 97.9 F | RESPIRATION RATE: 16 BRPM | HEART RATE: 72 BPM | BODY MASS INDEX: 29.86 KG/M2

## 2019-09-18 DIAGNOSIS — E03.9 HYPOTHYROIDISM, UNSPECIFIED TYPE: Primary | ICD-10-CM

## 2019-09-18 DIAGNOSIS — J30.2 SEASONAL ALLERGIC RHINITIS, UNSPECIFIED TRIGGER: ICD-10-CM

## 2019-09-18 DIAGNOSIS — Z00.00 HEALTHCARE MAINTENANCE: ICD-10-CM

## 2019-09-18 DIAGNOSIS — E55.9 VITAMIN D DEFICIENCY: ICD-10-CM

## 2019-09-18 DIAGNOSIS — K21.9 GASTROESOPHAGEAL REFLUX DISEASE WITHOUT ESOPHAGITIS: ICD-10-CM

## 2019-09-18 PROBLEM — N95.1 SYMPTOMATIC MENOPAUSAL OR FEMALE CLIMACTERIC STATES: Status: ACTIVE | Noted: 2019-09-18

## 2019-09-18 PROCEDURE — 90471 IMMUNIZATION ADMIN: CPT

## 2019-09-18 PROCEDURE — 90682 RIV4 VACC RECOMBINANT DNA IM: CPT

## 2019-09-18 PROCEDURE — 99214 OFFICE O/P EST MOD 30 MIN: CPT | Performed by: FAMILY MEDICINE

## 2019-09-18 RX ORDER — LEVOTHYROXINE SODIUM 137 MCG
137 TABLET ORAL DAILY
Qty: 90 TABLET | Refills: 3 | Status: SHIPPED | OUTPATIENT
Start: 2019-09-18 | End: 2019-09-20

## 2019-09-18 NOTE — PROGRESS NOTES
Assessment/Plan:  Patient received flu blok vaccine today  Patient given lab requisition for fasting labs as below  Patient to continue present treatment  Return to the office in 6 months  Diagnoses and all orders for this visit:    Hypothyroidism, unspecified type  -     SYNTHROID 137 MCG tablet; Take 1 tablet (137 mcg total) by mouth daily  -     CBC and Platelet; Future  -     Comprehensive metabolic panel; Future  -     Lipid panel; Future  -     TSH, 3rd generation with Free T4 reflex; Future  -     UA w Reflex to Microscopic w Reflex to Culture -Lab Collect    Gastroesophageal reflux disease without esophagitis    Seasonal allergic rhinitis, unspecified trigger    Vitamin D deficiency  -     Vitamin D 25 hydroxy; Future    Healthcare maintenance  -     influenza vaccine, [id number], quadrivalent, recombinant, PF, 0 5 mL, for patients 18 yr+ (FLUBLOK)          Subjective:      Patient ID: Flavia Matute is a 62 y o  female  Patient is here for follow-up appointment for chronic conditions and she is not fasting today  Patient requests yearly flu vaccine and lab requisition for fasting labs  Patient has been feeling well overall and has been walking 26131-71400 steps per day at work  She has been doing intermittent fasting 18 hours per day and has lost 12 lb over the past 6 weeks  Patient sees a gynecologist yearly and will be due for her yearly mammogram this month  Patient states she is up-to-date on colonoscopy  Thyroid Problem   Presents for follow-up visit  Symptoms include dry skin and weight loss  Patient reports no anxiety, cold intolerance, constipation, depressed mood, diaphoresis, diarrhea, fatigue, hair loss, heat intolerance, hoarse voice, leg swelling, palpitations, tremors, visual change or weight gain  The symptoms have been stable  Heartburn   She complains of heartburn   She reports no abdominal pain, no belching, no chest pain, no choking, no coughing, no dysphagia, no early satiety, no globus sensation, no hoarse voice or no nausea  This is a chronic problem  The problem occurs rarely  The problem has been gradually improving  The heartburn does not wake her from sleep  The heartburn does not limit her activity  The symptoms are aggravated by certain foods  Associated symptoms include weight loss  Pertinent negatives include no anemia, fatigue, melena or orthopnea  She has tried a PPI for the symptoms  The treatment provided significant relief  Past procedures include a UGI  The following portions of the patient's history were reviewed and updated as appropriate: allergies, current medications, past family history, past medical history, past social history, past surgical history and problem list     Review of Systems   Constitutional: Positive for weight loss  Negative for diaphoresis, fatigue and weight gain  HENT: Negative for hoarse voice  Respiratory: Negative for cough and choking  Cardiovascular: Negative for chest pain and palpitations  Gastrointestinal: Positive for heartburn  Negative for abdominal pain, constipation, diarrhea, dysphagia, melena and nausea  Endocrine: Negative for cold intolerance and heat intolerance  Neurological: Negative for tremors  Psychiatric/Behavioral: The patient is not nervous/anxious  Objective:      /72   Pulse 72   Temp 97 9 °F (36 6 °C) (Tympanic)   Resp 16   Ht 5' 8" (1 727 m)   Wt 89 4 kg (197 lb)   BMI 29 95 kg/m²          Physical Exam   Constitutional: She is oriented to person, place, and time  She appears well-developed and well-nourished  No distress  HENT:   Head: Normocephalic  Right Ear: External ear normal    Left Ear: External ear normal    Nose: Nose normal    Mouth/Throat: Oropharynx is clear and moist    Eyes: Conjunctivae are normal  No scleral icterus  Neck: Neck supple  No thyromegaly present  Cardiovascular: Normal rate and regular rhythm     Pulmonary/Chest: Effort normal and breath sounds normal    Abdominal: Soft  There is no tenderness  Musculoskeletal: She exhibits no edema  Lymphadenopathy:     She has no cervical adenopathy  Neurological: She is alert and oriented to person, place, and time  Skin: Skin is warm and dry  Psychiatric: She has a normal mood and affect  BMI Counseling: Body mass index is 29 95 kg/m²  The BMI is above normal  Nutrition recommendations include reducing portion sizes, decreasing overall calorie intake, reducing fast food intake, consuming healthier snacks, decreasing soda and/or juice intake, moderation in carbohydrate intake and reducing intake of saturated fat and trans fat  Exercise recommendations include moderate aerobic physical activity for 150 minutes/week

## 2019-09-20 DIAGNOSIS — E03.9 HYPOTHYROIDISM, UNSPECIFIED TYPE: ICD-10-CM

## 2019-09-20 LAB
25(OH)D3 SERPL-MCNC: 25 NG/ML (ref 30–100)
ALBUMIN SERPL-MCNC: 4.3 G/DL (ref 3.6–5.1)
ALBUMIN/GLOB SERPL: 2 (CALC) (ref 1–2.5)
ALP SERPL-CCNC: 73 U/L (ref 33–130)
ALT SERPL-CCNC: 21 U/L (ref 6–29)
APPEARANCE UR: CLEAR
AST SERPL-CCNC: 22 U/L (ref 10–35)
BACTERIA UR CULT: NORMAL
BACTERIA UR QL AUTO: NORMAL /HPF
BASOPHILS # BLD AUTO: 42 CELLS/UL (ref 0–200)
BASOPHILS NFR BLD AUTO: 0.8 %
BILIRUB SERPL-MCNC: 1.7 MG/DL (ref 0.2–1.2)
BILIRUB UR QL STRIP: NEGATIVE
BUN SERPL-MCNC: 13 MG/DL (ref 7–25)
BUN/CREAT SERPL: ABNORMAL (CALC) (ref 6–22)
CALCIUM SERPL-MCNC: 9.3 MG/DL (ref 8.6–10.4)
CHLORIDE SERPL-SCNC: 104 MMOL/L (ref 98–110)
CHOLEST SERPL-MCNC: 211 MG/DL
CHOLEST/HDLC SERPL: 3.1 (CALC)
CO2 SERPL-SCNC: 26 MMOL/L (ref 20–32)
COLOR UR: YELLOW
CREAT SERPL-MCNC: 0.73 MG/DL (ref 0.5–1.05)
EOSINOPHIL # BLD AUTO: 52 CELLS/UL (ref 15–500)
EOSINOPHIL NFR BLD AUTO: 1 %
ERYTHROCYTE [DISTWIDTH] IN BLOOD BY AUTOMATED COUNT: 12.1 % (ref 11–15)
GLOBULIN SER CALC-MCNC: 2.1 G/DL (CALC) (ref 1.9–3.7)
GLUCOSE SERPL-MCNC: 70 MG/DL (ref 65–99)
GLUCOSE UR QL STRIP: NEGATIVE
HCT VFR BLD AUTO: 41.1 % (ref 35–45)
HDLC SERPL-MCNC: 67 MG/DL
HGB BLD-MCNC: 13.4 G/DL (ref 11.7–15.5)
HGB UR QL STRIP: NEGATIVE
HYALINE CASTS #/AREA URNS LPF: NORMAL /LPF
KETONES UR QL STRIP: NEGATIVE
LDLC SERPL CALC-MCNC: 129 MG/DL (CALC)
LEUKOCYTE ESTERASE UR QL STRIP: NEGATIVE
LYMPHOCYTES # BLD AUTO: 1612 CELLS/UL (ref 850–3900)
LYMPHOCYTES NFR BLD AUTO: 31 %
MCH RBC QN AUTO: 27.2 PG (ref 27–33)
MCHC RBC AUTO-ENTMCNC: 32.6 G/DL (ref 32–36)
MCV RBC AUTO: 83.5 FL (ref 80–100)
MONOCYTES # BLD AUTO: 333 CELLS/UL (ref 200–950)
MONOCYTES NFR BLD AUTO: 6.4 %
NEUTROPHILS # BLD AUTO: 3162 CELLS/UL (ref 1500–7800)
NEUTROPHILS NFR BLD AUTO: 60.8 %
NITRITE UR QL STRIP: NEGATIVE
NONHDLC SERPL-MCNC: 144 MG/DL (CALC)
PH UR STRIP: 6 [PH] (ref 5–8)
PLATELET # BLD AUTO: 240 THOUSAND/UL (ref 140–400)
PMV BLD REES-ECKER: 10.6 FL (ref 7.5–12.5)
POTASSIUM SERPL-SCNC: 3.9 MMOL/L (ref 3.5–5.3)
PROT SERPL-MCNC: 6.4 G/DL (ref 6.1–8.1)
PROT UR QL STRIP: NEGATIVE
RBC # BLD AUTO: 4.92 MILLION/UL (ref 3.8–5.1)
RBC #/AREA URNS HPF: NORMAL /HPF
SL AMB EGFR AFRICAN AMERICAN: 107 ML/MIN/1.73M2
SL AMB EGFR NON AFRICAN AMERICAN: 92 ML/MIN/1.73M2
SODIUM SERPL-SCNC: 139 MMOL/L (ref 135–146)
SP GR UR STRIP: 1.02 (ref 1–1.03)
SQUAMOUS #/AREA URNS HPF: NORMAL /HPF
T4 FREE SERPL-MCNC: 1.7 NG/DL (ref 0.8–1.8)
TRIGL SERPL-MCNC: 61 MG/DL
TSH SERPL-ACNC: 0.19 MIU/L (ref 0.4–4.5)
WBC # BLD AUTO: 5.2 THOUSAND/UL (ref 3.8–10.8)
WBC #/AREA URNS HPF: NORMAL /HPF

## 2019-09-20 RX ORDER — LEVOTHYROXINE SODIUM 125 UG/1
125 TABLET ORAL DAILY
Qty: 90 TABLET | Refills: 1 | Status: SHIPPED | OUTPATIENT
Start: 2019-09-20 | End: 2019-09-23 | Stop reason: SDUPTHER

## 2019-09-23 ENCOUNTER — TELEPHONE (OUTPATIENT)
Dept: FAMILY MEDICINE CLINIC | Facility: CLINIC | Age: 54
End: 2019-09-23

## 2019-09-23 DIAGNOSIS — J06.9 UPPER RESPIRATORY TRACT INFECTION, UNSPECIFIED TYPE: Primary | ICD-10-CM

## 2019-09-23 DIAGNOSIS — E03.9 HYPOTHYROIDISM, UNSPECIFIED TYPE: ICD-10-CM

## 2019-09-23 RX ORDER — AZITHROMYCIN 250 MG/1
TABLET, FILM COATED ORAL
Qty: 6 TABLET | Refills: 0 | Status: SHIPPED | OUTPATIENT
Start: 2019-09-23 | End: 2019-09-27

## 2019-09-23 RX ORDER — LEVOTHYROXINE SODIUM 125 UG/1
125 TABLET ORAL DAILY
Qty: 90 TABLET | Refills: 0 | Status: SHIPPED | OUTPATIENT
Start: 2019-09-23 | End: 2020-01-20

## 2019-09-23 NOTE — TELEPHONE ENCOUNTER
I spoke to pt and she states that she is now having the same cold sx as her  Elidia Lloyd), and is requesting the same meds (azithromycin) that you prescribed him on 9/18, as he is feeling much better after taking them  Please advise  Thank you

## 2019-09-23 NOTE — TELEPHONE ENCOUNTER
Prescription sent to [facility] pharmacy in [place] for azithromycin  Please notify patient

## 2019-09-23 NOTE — TELEPHONE ENCOUNTER
Pt is requesting a 90 day supply of the Synthroid be sent to the [facility], as they just started using the mail order pharmacy  Please advise  Thank you

## 2019-10-04 ENCOUNTER — OFFICE VISIT (OUTPATIENT)
Dept: FAMILY MEDICINE CLINIC | Facility: CLINIC | Age: 54
End: 2019-10-04
Payer: COMMERCIAL

## 2019-10-04 VITALS
TEMPERATURE: 97.6 F | DIASTOLIC BLOOD PRESSURE: 62 MMHG | OXYGEN SATURATION: 99 % | BODY MASS INDEX: 29.4 KG/M2 | HEART RATE: 72 BPM | RESPIRATION RATE: 16 BRPM | HEIGHT: 68 IN | SYSTOLIC BLOOD PRESSURE: 104 MMHG | WEIGHT: 194 LBS

## 2019-10-04 DIAGNOSIS — M70.42 PREPATELLAR BURSITIS OF LEFT KNEE: ICD-10-CM

## 2019-10-04 DIAGNOSIS — M25.562 LEFT KNEE PAIN, UNSPECIFIED CHRONICITY: Primary | ICD-10-CM

## 2019-10-04 PROCEDURE — 99213 OFFICE O/P EST LOW 20 MIN: CPT | Performed by: FAMILY MEDICINE

## 2019-10-04 RX ORDER — NAPROXEN 500 MG/1
500 TABLET ORAL 2 TIMES DAILY WITH MEALS
Qty: 30 TABLET | Refills: 0 | Status: SHIPPED | OUTPATIENT
Start: 2019-10-04 | End: 2020-02-05

## 2019-10-04 NOTE — PROGRESS NOTES
Assessment/Plan:  Patient was started on naproxen 500 mg 1 b i d  With food for 1-2 weeks and recommend ice for 20 minutes 3-4 times daily  Rest and leg elevation and avoid kneeling  Return to the office in 1-2 weeks or sooner jackie Donovan Diagnoses and all orders for this visit:    Left knee pain, unspecified chronicity  -     naproxen (NAPROSYN) 500 mg tablet; Take 1 tablet (500 mg total) by mouth 2 (two) times a day with meals    Prepatellar bursitis of left knee  -     naproxen (NAPROSYN) 500 mg tablet; Take 1 tablet (500 mg total) by mouth 2 (two) times a day with meals          Subjective:      Patient ID: Pricila Mcknight is a 62 y o  female  Patient complains of swelling of the left knee since yesterday 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 orthopedic specialist on 09/19/2019 and states she had an x-ray of her left knee which revealed arthritis  She was treated with diclofenac twice a day for 1 week  Patient has treated this episode since yesterday with heat without significant relief  Knee Pain    Incident onset: 3 months ago  The incident occurred at work  The injury mechanism was a direct blow  The pain is present in the left knee and left thigh  The quality of the pain is described as burning  Pertinent negatives include no inability to bear weight, loss of motion, loss of sensation, muscle weakness, numbness or tingling  Exacerbated by: sitting  She has tried heat and NSAIDs for the symptoms  The treatment provided mild relief         The following portions of the patient's history were reviewed and updated as appropriate: allergies, current medications, past family history, past medical history, past social history, past surgical history and problem list     Review of Systems   Neurological: Negative for tingling and numbness  Objective:      /62 (BP Location: Left arm, Patient Position: Sitting, Cuff Size: Standard)   Pulse 72   Temp 97 6 °F (36 4 °C) (Tympanic)   Resp 16   Ht 5' 8 31" (1 735 m)   Wt 88 kg (194 lb)   LMP  (LMP Unknown)   SpO2 99%   BMI 29 23 kg/m²          Physical Exam   Constitutional: She is oriented to person, place, and time  She appears well-developed and well-nourished  No distress  HENT:   Head: Normocephalic  Mouth/Throat: Oropharynx is clear and moist    Eyes: Conjunctivae are normal  No scleral icterus  Neck: Neck supple  Cardiovascular: Normal rate and regular rhythm  Pulmonary/Chest: Effort normal and breath sounds normal    Abdominal: Soft  There is no tenderness  Musculoskeletal: She exhibits no edema  Left knee range of motion intact and nontender  Positive prepatellar bursa swelling  Negative erythema, ecchymosis or increased warmth  Negative joint effusion  Negative patellar compression tenderness  Negative popliteal tenderness  Negative joint line tenderness  Lymphadenopathy:     She has no cervical adenopathy  Neurological: She is alert and oriented to person, place, and time  Skin: Skin is warm and dry  Psychiatric: She has a normal mood and affect

## 2019-10-24 ENCOUNTER — TELEPHONE (OUTPATIENT)
Dept: FAMILY MEDICINE CLINIC | Facility: CLINIC | Age: 54
End: 2019-10-24

## 2019-10-24 NOTE — TELEPHONE ENCOUNTER
Patient called in stating that on Monday she had her knee drained by her orthopedic specialist  She resumed taking her Naproxen to try to elevate some swelling however since then she has had a queasy and burning feeling in her stomach as well as cramping and belching  She thinks it's due to the Naproxen because she had this before when she first started taking it when it was originally prescribed on 10/4  She wants to know if there is anything that she can take to help her stomach because it is keeping her up at night? Also if there is anything to replace the naproxen?

## 2019-11-11 ENCOUNTER — TELEPHONE (OUTPATIENT)
Dept: FAMILY MEDICINE CLINIC | Facility: CLINIC | Age: 54
End: 2019-11-11

## 2019-11-11 DIAGNOSIS — J06.9 UPPER RESPIRATORY TRACT INFECTION, UNSPECIFIED TYPE: Primary | ICD-10-CM

## 2019-11-11 RX ORDER — DOXYCYCLINE HYCLATE 100 MG/1
100 CAPSULE ORAL EVERY 12 HOURS SCHEDULED
Qty: 14 CAPSULE | Refills: 0 | Status: SHIPPED | OUTPATIENT
Start: 2019-11-11 | End: 2019-11-18

## 2019-11-11 NOTE — TELEPHONE ENCOUNTER
Prescription sent to [facility] pharmacy on [address] in [place] for doxycycline  Please notify patient

## 2019-11-11 NOTE — TELEPHONE ENCOUNTER
Spiritual Care referral    Received referral from palliative care to offer prayer support with patient and family. Offered listening and supportive prayer. Prayer is very much welcomed and invited. Spiritual Care will continue to follow for prayer support. Sue Enrique, [address]      10/23/18 1404   Encounter Summary   Services provided to: Patient and family together   Referral/Consult From: [address] Family members   Continue Visiting (10/23 Prayer support/welcomes prayer regularly)   Complexity of Encounter High   Length of Encounter 15 minutes   Spiritual Assessment Completed Yes   Spiritual/Moravian   Type Spiritual support   Assessment Approachable   Intervention Active listening;Nurtured hope;Prayer   Outcome Expressed gratitude; Connection/belonging;Comfort Took call from patient stating she is coming down with something she thinks she caught from her   Patient's throat and ears are bothering her  Patient unable to come into office due to a new job  Patient states you prescribed  Gee Lipscomb Doxycycline a few weeks ago and it helped him, would like the same  Made patient aware she may need to be evalutated prior       Please advise

## 2020-01-19 DIAGNOSIS — E03.9 HYPOTHYROIDISM, UNSPECIFIED TYPE: ICD-10-CM

## 2020-01-20 RX ORDER — LEVOTHYROXINE SODIUM 125 UG/1
TABLET ORAL
Qty: 90 TABLET | Refills: 0 | Status: SHIPPED | OUTPATIENT
Start: 2020-01-20 | End: 2020-01-21 | Stop reason: SDUPTHER

## 2020-01-21 DIAGNOSIS — E03.9 HYPOTHYROIDISM, UNSPECIFIED TYPE: ICD-10-CM

## 2020-01-21 RX ORDER — LEVOTHYROXINE SODIUM 125 UG/1
125 TABLET ORAL DAILY
Qty: 30 TABLET | Refills: 2 | Status: SHIPPED | OUTPATIENT
Start: 2020-01-21 | End: 2020-05-03

## 2020-01-21 NOTE — TELEPHONE ENCOUNTER
Patient had recently requested refill; however, it was sent as 90 day supply to mail order  She is no longer with that company and does not have this service anymore  She is requesting at least a 30 day supply be sent to [facility] listed in profile

## 2020-02-05 ENCOUNTER — OFFICE VISIT (OUTPATIENT)
Dept: FAMILY MEDICINE CLINIC | Facility: CLINIC | Age: 55
End: 2020-02-05
Payer: COMMERCIAL

## 2020-02-05 VITALS
HEIGHT: 67 IN | HEART RATE: 80 BPM | WEIGHT: 200 LBS | BODY MASS INDEX: 31.39 KG/M2 | DIASTOLIC BLOOD PRESSURE: 72 MMHG | SYSTOLIC BLOOD PRESSURE: 112 MMHG | TEMPERATURE: 98.1 F | OXYGEN SATURATION: 98 % | RESPIRATION RATE: 16 BRPM

## 2020-02-05 DIAGNOSIS — E55.9 VITAMIN D DEFICIENCY: ICD-10-CM

## 2020-02-05 DIAGNOSIS — J30.2 SEASONAL ALLERGIC RHINITIS, UNSPECIFIED TRIGGER: ICD-10-CM

## 2020-02-05 DIAGNOSIS — E03.9 HYPOTHYROIDISM, UNSPECIFIED TYPE: Primary | ICD-10-CM

## 2020-02-05 LAB — TSH SERPL DL<=0.05 MIU/L-ACNC: 1.26 UIU/ML (ref 0.36–3.74)

## 2020-02-05 PROCEDURE — 84443 ASSAY THYROID STIM HORMONE: CPT | Performed by: FAMILY MEDICINE

## 2020-02-05 PROCEDURE — 1036F TOBACCO NON-USER: CPT | Performed by: FAMILY MEDICINE

## 2020-02-05 PROCEDURE — 3008F BODY MASS INDEX DOCD: CPT | Performed by: FAMILY MEDICINE

## 2020-02-05 PROCEDURE — 36415 COLL VENOUS BLD VENIPUNCTURE: CPT | Performed by: FAMILY MEDICINE

## 2020-02-05 PROCEDURE — 99214 OFFICE O/P EST MOD 30 MIN: CPT | Performed by: FAMILY MEDICINE

## 2020-02-05 RX ORDER — MULTIVIT WITH MINERALS/LUTEIN
1000 TABLET ORAL DAILY
COMMUNITY
End: 2020-09-02

## 2020-02-05 RX ORDER — FLUTICASONE PROPIONATE 50 MCG
2 SPRAY, SUSPENSION (ML) NASAL DAILY
Qty: 1 BOTTLE | Refills: 3 | Status: SHIPPED | OUTPATIENT
Start: 2020-02-05 | End: 2020-09-02

## 2020-02-05 NOTE — PROGRESS NOTES
Assessment/Plan:  Labs drawn for TSH with reflex to free T4  Will heed results  Patient to continue present treatment and will add Flonase nasal spray 2 sprays per nostril daily  She may take Mucinex p r n  Recommend increase fluids and rest   Return the office in 6 months  Diagnoses and all orders for this visit:    Hypothyroidism, unspecified type  -     TSH, 3rd generation with Free T4 reflex    Seasonal allergic rhinitis, unspecified trigger  -     fluticasone (FLONASE) 50 mcg/act nasal spray; 2 sprays into each nostril daily    Vitamin D deficiency    Other orders  -     vitamin E, tocopherol, 1,000 units capsule; Take 1,000 Units by mouth daily          Subjective:      Patient ID: Leon Yang is a 62 y o  female  Patient is here for follow-up appointment for chronic conditions and due for repeat thyroid labs after dose decreased few months ago  Patient recently getting over cold symptoms  Otherwise she has been feeling well overall and has been exercising regularly  Thyroid Problem   Presents for follow-up visit  Symptoms include constipation and dry skin  Patient reports no anxiety, cold intolerance, depressed mood, diaphoresis, diarrhea, fatigue, hair loss, heat intolerance, hoarse voice, leg swelling, palpitations, tremors, visual change, weight gain or weight loss  The symptoms have been stable  URI    This is a new problem  The current episode started in the past 7 days  The problem has been gradually improving  There has been no fever  Associated symptoms include congestion and a plugged ear sensation  Pertinent negatives include no coughing, diarrhea, ear pain, headaches, rhinorrhea, sinus pain, sneezing, sore throat or wheezing  She has tried increased fluids (JUAN C SELTZER PLUS, MUCINEX) for the symptoms  The treatment provided moderate relief         The following portions of the patient's history were reviewed and updated as appropriate: allergies, current medications, past family history, past medical history, past social history, past surgical history and problem list     Review of Systems   Constitutional: Negative for diaphoresis, fatigue, weight gain and weight loss  HENT: Positive for congestion  Negative for ear pain, hoarse voice, rhinorrhea, sinus pain, sneezing and sore throat  Respiratory: Negative for cough and wheezing  Cardiovascular: Negative for palpitations  Gastrointestinal: Positive for constipation  Negative for diarrhea  Endocrine: Negative for cold intolerance and heat intolerance  Neurological: Negative for tremors and headaches  Psychiatric/Behavioral: The patient is not nervous/anxious  Objective:      /72 (BP Location: Left arm, Patient Position: Sitting, Cuff Size: Standard)   Pulse 80   Temp 98 1 °F (36 7 °C) (Tympanic)   Resp 16   Ht 5' 7" (1 702 m)   Wt 90 7 kg (200 lb)   LMP  (LMP Unknown)   SpO2 98%   BMI 31 32 kg/m²          Physical Exam   Constitutional: She is oriented to person, place, and time  She appears well-developed and well-nourished  HENT:   Head: Normocephalic  Right Ear: External ear normal    Left Ear: External ear normal    Mouth/Throat: Oropharynx is clear and moist    Mild turbinates swelling with clear drainage  Eyes: Conjunctivae are normal  No scleral icterus  Neck: Neck supple  No thyromegaly present  Cardiovascular: Normal rate and regular rhythm  Pulmonary/Chest: Effort normal and breath sounds normal  She has no wheezes  Abdominal: Soft  There is no tenderness  Musculoskeletal: She exhibits no edema  Lymphadenopathy:     She has no cervical adenopathy  Neurological: She is alert and oriented to person, place, and time  Skin: Skin is warm and dry  Psychiatric: She has a normal mood and affect  BMI Counseling: Body mass index is 31 32 kg/m²   The BMI is above normal  Nutrition recommendations include decreasing overall calorie intake, 3-5 servings of fruits/vegetables daily, reducing fast food intake, consuming healthier snacks, decreasing soda and/or juice intake, moderation in carbohydrate intake and reducing intake of saturated fat and trans fat  Exercise recommendations include moderate aerobic physical activity for 150 minutes/week

## 2020-05-03 DIAGNOSIS — E03.9 HYPOTHYROIDISM, UNSPECIFIED TYPE: ICD-10-CM

## 2020-05-03 RX ORDER — LEVOTHYROXINE SODIUM 125 UG/1
TABLET ORAL
Qty: 90 TABLET | Refills: 0 | Status: SHIPPED | OUTPATIENT
Start: 2020-05-03 | End: 2020-09-06

## 2020-08-11 ENCOUNTER — TELEPHONE (OUTPATIENT)
Dept: FAMILY MEDICINE CLINIC | Facility: CLINIC | Age: 55
End: 2020-08-11

## 2020-08-11 DIAGNOSIS — Z82.49 FAMILY HISTORY OF ISCHEMIC HEART DISEASE (IHD): ICD-10-CM

## 2020-08-11 DIAGNOSIS — E03.9 HYPOTHYROIDISM, UNSPECIFIED TYPE: Primary | ICD-10-CM

## 2020-08-11 DIAGNOSIS — E55.9 VITAMIN D DEFICIENCY: ICD-10-CM

## 2020-08-24 ENCOUNTER — TELEPHONE (OUTPATIENT)
Dept: ADMINISTRATIVE | Facility: HOSPITAL | Age: 55
End: 2020-08-24

## 2020-08-24 NOTE — TELEPHONE ENCOUNTER
Called pt, unable to leave message  First Name: Nawaf Jack Name: Hope Harrison  Day of Birth: Thursday, August 8, 1965  Address: [address] [place] [address]   City/State/Zip: [facility] [facility] Kristina Meneses  Email: [email or web address]  Phone: [phone]  Service Area: Vascular  Comments:       

## 2020-08-28 ENCOUNTER — TRANSCRIBE ORDERS (OUTPATIENT)
Dept: LAB | Facility: CLINIC | Age: 55
End: 2020-08-28

## 2020-08-28 ENCOUNTER — APPOINTMENT (OUTPATIENT)
Dept: LAB | Facility: CLINIC | Age: 55
End: 2020-08-28
Payer: COMMERCIAL

## 2020-08-28 DIAGNOSIS — Z82.49 FAMILY HISTORY OF ISCHEMIC HEART DISEASE (IHD): ICD-10-CM

## 2020-08-28 DIAGNOSIS — E55.9 VITAMIN D DEFICIENCY: ICD-10-CM

## 2020-08-28 DIAGNOSIS — E03.9 HYPOTHYROIDISM, UNSPECIFIED TYPE: ICD-10-CM

## 2020-08-28 LAB
25(OH)D3 SERPL-MCNC: 28.3 NG/ML (ref 30–100)
ALBUMIN SERPL BCP-MCNC: 3.8 G/DL (ref 3.5–5)
ALP SERPL-CCNC: 67 U/L (ref 46–116)
ALT SERPL W P-5'-P-CCNC: 31 U/L (ref 12–78)
ANION GAP SERPL CALCULATED.3IONS-SCNC: 9 MMOL/L (ref 4–13)
AST SERPL W P-5'-P-CCNC: 20 U/L (ref 5–45)
BILIRUB SERPL-MCNC: 1.37 MG/DL (ref 0.2–1)
BILIRUB UR QL STRIP: NEGATIVE
BUN SERPL-MCNC: 15 MG/DL (ref 5–25)
CALCIUM SERPL-MCNC: 8.8 MG/DL (ref 8.3–10.1)
CHLORIDE SERPL-SCNC: 104 MMOL/L (ref 100–108)
CHOLEST SERPL-MCNC: 216 MG/DL (ref 50–200)
CLARITY UR: CLEAR
CO2 SERPL-SCNC: 24 MMOL/L (ref 21–32)
COLOR UR: YELLOW
CREAT SERPL-MCNC: 0.67 MG/DL (ref 0.6–1.3)
ERYTHROCYTE [DISTWIDTH] IN BLOOD BY AUTOMATED COUNT: 12.1 % (ref 11.6–15.1)
GFR SERPL CREATININE-BSD FRML MDRD: 98 ML/MIN/1.73SQ M
GLUCOSE P FAST SERPL-MCNC: 86 MG/DL (ref 65–99)
GLUCOSE UR STRIP-MCNC: NEGATIVE MG/DL
HCT VFR BLD AUTO: 42.2 % (ref 34.8–46.1)
HDLC SERPL-MCNC: 78 MG/DL
HGB BLD-MCNC: 13.6 G/DL (ref 11.5–15.4)
HGB UR QL STRIP.AUTO: NEGATIVE
KETONES UR STRIP-MCNC: NEGATIVE MG/DL
LDLC SERPL CALC-MCNC: 129 MG/DL (ref 0–100)
LEUKOCYTE ESTERASE UR QL STRIP: NEGATIVE
MCH RBC QN AUTO: 28.3 PG (ref 26.8–34.3)
MCHC RBC AUTO-ENTMCNC: 32.2 G/DL (ref 31.4–37.4)
MCV RBC AUTO: 88 FL (ref 82–98)
NITRITE UR QL STRIP: NEGATIVE
NONHDLC SERPL-MCNC: 138 MG/DL
PH UR STRIP.AUTO: 6 [PH]
PLATELET # BLD AUTO: 238 THOUSANDS/UL (ref 149–390)
PMV BLD AUTO: 9.7 FL (ref 8.9–12.7)
POTASSIUM SERPL-SCNC: 3.8 MMOL/L (ref 3.5–5.3)
PROT SERPL-MCNC: 6.6 G/DL (ref 6.4–8.2)
PROT UR STRIP-MCNC: NEGATIVE MG/DL
RBC # BLD AUTO: 4.81 MILLION/UL (ref 3.81–5.12)
SODIUM SERPL-SCNC: 137 MMOL/L (ref 136–145)
SP GR UR STRIP.AUTO: 1.01 (ref 1–1.03)
T4 FREE SERPL-MCNC: 1.18 NG/DL (ref 0.76–1.46)
TRIGL SERPL-MCNC: 45 MG/DL
TSH SERPL DL<=0.05 MIU/L-ACNC: 4.2 UIU/ML (ref 0.36–3.74)
UROBILINOGEN UR QL STRIP.AUTO: 0.2 E.U./DL
WBC # BLD AUTO: 4.15 THOUSAND/UL (ref 4.31–10.16)

## 2020-08-28 PROCEDURE — 82306 VITAMIN D 25 HYDROXY: CPT

## 2020-08-28 PROCEDURE — 84439 ASSAY OF FREE THYROXINE: CPT

## 2020-08-28 PROCEDURE — 80061 LIPID PANEL: CPT

## 2020-08-28 PROCEDURE — 84443 ASSAY THYROID STIM HORMONE: CPT

## 2020-08-28 PROCEDURE — 36415 COLL VENOUS BLD VENIPUNCTURE: CPT

## 2020-08-28 PROCEDURE — 85027 COMPLETE CBC AUTOMATED: CPT

## 2020-08-28 PROCEDURE — 80053 COMPREHEN METABOLIC PANEL: CPT

## 2020-08-28 PROCEDURE — 81003 URINALYSIS AUTO W/O SCOPE: CPT | Performed by: FAMILY MEDICINE

## 2020-08-31 ENCOUNTER — TELEPHONE (OUTPATIENT)
Dept: FAMILY MEDICINE CLINIC | Facility: CLINIC | Age: 55
End: 2020-08-31

## 2020-08-31 NOTE — TELEPHONE ENCOUNTER
KIMBERLYI - Pt called in explaining that she noticed for about 2 weeks she has had left leg pain behind her thigh that radiates up into her buttocks and its getting worse  She stated there is no redness or swelling, nor hot to the touch, but she was worried if it was a possible DVT  I offered pt an appt for today but she was unable to make it and needs a late appt due to work  She preferred not to see [facility] tomorrow so is currently scheduled with you for this Wednesday in the evening  I did advise pt to report to the ER for any worsening sx

## 2020-09-02 ENCOUNTER — OFFICE VISIT (OUTPATIENT)
Dept: FAMILY MEDICINE CLINIC | Facility: CLINIC | Age: 55
End: 2020-09-02
Payer: COMMERCIAL

## 2020-09-02 VITALS
HEIGHT: 67 IN | WEIGHT: 201 LBS | BODY MASS INDEX: 31.55 KG/M2 | HEART RATE: 76 BPM | RESPIRATION RATE: 16 BRPM | DIASTOLIC BLOOD PRESSURE: 70 MMHG | SYSTOLIC BLOOD PRESSURE: 108 MMHG | OXYGEN SATURATION: 97 % | TEMPERATURE: 97.6 F

## 2020-09-02 DIAGNOSIS — M54.32 LEFT SIDED SCIATICA: Primary | ICD-10-CM

## 2020-09-02 PROCEDURE — 99213 OFFICE O/P EST LOW 20 MIN: CPT | Performed by: FAMILY MEDICINE

## 2020-09-02 RX ORDER — MELOXICAM 15 MG/1
15 TABLET ORAL DAILY
Qty: 30 TABLET | Refills: 0 | Status: SHIPPED | OUTPATIENT
Start: 2020-09-02 | End: 2021-08-09

## 2020-09-02 RX ORDER — CYCLOBENZAPRINE HCL 10 MG
10 TABLET ORAL
Qty: 30 TABLET | Refills: 0 | Status: SHIPPED | OUTPATIENT
Start: 2020-09-02 | End: 2021-08-09

## 2020-09-02 NOTE — PROGRESS NOTES
Assessment/Plan:  Patient was started on meloxicam 15 mg 1 daily with food and instructed to discontinue ibuprofen  Patient was started on cyclobenzaprine 10 mg 1 q h s  p r n , caution regarding drowsiness  Patient may apply ice alternating with heat 2-3 times daily p r n  and rest   Return the office in 1-2 weeks or call sooner p r n  Kimberly Flynn Diagnoses and all orders for this visit:    Left sided sciatica  -     meloxicam (MOBIC) 15 mg tablet; Take 1 tablet (15 mg total) by mouth daily  -     cyclobenzaprine (FLEXERIL) 10 mg tablet; Take 1 tablet (10 mg total) by mouth daily at bedtime as needed for muscle spasms    Other orders  -     Cancel: Mammo screening bilateral w 3d & cad; Future          Subjective:      Patient ID: Shon Wilkinson is a 60 y o  female  Patient complains of left posterior thigh pain for the past 2 weeks  She denies any specific injury or fall  Patient denies low back pain although states this pain is similar to the pain she had 2 years ago when she was seen in the emergency room and had a venous duplex which was negative and was treated for sciatic pain  Patient denies any leg swelling  Patient treated this with Motrin with some relief  Patient admits to walking 14,000 steps daily  Leg Pain    The incident occurred more than 1 week ago  There was no injury mechanism  The pain is present in the left leg and left thigh (left buttocks)  The quality of the pain is described as stabbing  The pain has been intermittent since onset  Pertinent negatives include no inability to bear weight, loss of motion, loss of sensation, muscle weakness, numbness or tingling  The symptoms are aggravated by weight bearing  She has tried NSAIDs for the symptoms  The treatment provided mild relief         The following portions of the patient's history were reviewed and updated as appropriate: allergies, current medications, past family history, past medical history, past social history, past surgical history and problem list     Review of Systems   Respiratory: Negative for cough, chest tightness, shortness of breath and wheezing  Cardiovascular: Negative for chest pain, palpitations and leg swelling  Musculoskeletal: Negative for arthralgias, back pain, gait problem, joint swelling, myalgias, neck pain and neck stiffness  Neurological: Negative for tingling and numbness  Objective:      /70 (BP Location: Left arm, Patient Position: Sitting, Cuff Size: Standard)   Pulse 76   Temp 97 6 °F (36 4 °C)   Resp 16   Ht 5' 7" (1 702 m)   Wt 91 2 kg (201 lb)   LMP  (LMP Unknown)   SpO2 97%   BMI 31 48 kg/m²          Physical Exam  Constitutional:       General: She is not in acute distress  Appearance: Normal appearance  HENT:      Head: Normocephalic  Mouth/Throat:      Mouth: Mucous membranes are moist    Eyes:      General: No scleral icterus  Conjunctiva/sclera: Conjunctivae normal    Neck:      Musculoskeletal: Neck supple  Cardiovascular:      Rate and Rhythm: Normal rate and regular rhythm  Pulmonary:      Effort: Pulmonary effort is normal       Breath sounds: Normal breath sounds  Abdominal:      Palpations: Abdomen is soft  Tenderness: There is no abdominal tenderness  Musculoskeletal:         General: Tenderness present  Right lower leg: No edema  Left lower leg: No edema  Comments: Positive left lumbosacral tenderness to palpation  Positive left straight leg raise  Positive tenderness left posterior distal thigh and medial knee tenderness  Negative calf pain and negative Homans sign  Lymphadenopathy:      Cervical: No cervical adenopathy  Skin:     General: Skin is warm and dry  Neurological:      Mental Status: She is alert and oriented to person, place, and time     Psychiatric:         Mood and Affect: Mood normal

## 2020-09-05 DIAGNOSIS — E03.9 HYPOTHYROIDISM, UNSPECIFIED TYPE: ICD-10-CM

## 2020-09-06 RX ORDER — LEVOTHYROXINE SODIUM 125 UG/1
TABLET ORAL
Qty: 90 TABLET | Refills: 0 | Status: SHIPPED | OUTPATIENT
Start: 2020-09-06 | End: 2021-01-25

## 2020-09-23 ENCOUNTER — CLINICAL SUPPORT (OUTPATIENT)
Dept: FAMILY MEDICINE CLINIC | Facility: CLINIC | Age: 55
End: 2020-09-23
Payer: COMMERCIAL

## 2020-09-23 DIAGNOSIS — Z23 NEED FOR INFLUENZA VACCINATION: Primary | ICD-10-CM

## 2020-09-23 PROCEDURE — 90471 IMMUNIZATION ADMIN: CPT

## 2020-09-23 PROCEDURE — 90682 RIV4 VACC RECOMBINANT DNA IM: CPT

## 2020-12-14 ENCOUNTER — TELEMEDICINE (OUTPATIENT)
Dept: FAMILY MEDICINE CLINIC | Facility: CLINIC | Age: 55
End: 2020-12-14
Payer: COMMERCIAL

## 2020-12-14 DIAGNOSIS — R10.13 EPIGASTRIC PAIN: ICD-10-CM

## 2020-12-14 DIAGNOSIS — K21.9 GASTROESOPHAGEAL REFLUX DISEASE WITHOUT ESOPHAGITIS: Primary | ICD-10-CM

## 2020-12-14 PROCEDURE — 99214 OFFICE O/P EST MOD 30 MIN: CPT | Performed by: FAMILY MEDICINE

## 2020-12-14 RX ORDER — PANTOPRAZOLE SODIUM 40 MG/1
40 TABLET, DELAYED RELEASE ORAL
Qty: 30 TABLET | Refills: 5 | Status: SHIPPED | OUTPATIENT
Start: 2020-12-14 | End: 2021-08-09

## 2021-01-25 DIAGNOSIS — E03.9 HYPOTHYROIDISM, UNSPECIFIED TYPE: ICD-10-CM

## 2021-01-25 RX ORDER — LEVOTHYROXINE SODIUM 125 UG/1
TABLET ORAL
Qty: 90 TABLET | Refills: 0 | Status: SHIPPED | OUTPATIENT
Start: 2021-01-25 | End: 2021-05-31

## 2021-03-10 DIAGNOSIS — Z23 ENCOUNTER FOR IMMUNIZATION: ICD-10-CM

## 2021-03-21 ENCOUNTER — IMMUNIZATIONS (OUTPATIENT)
Dept: FAMILY MEDICINE CLINIC | Facility: HOSPITAL | Age: 56
End: 2021-03-21

## 2021-03-21 DIAGNOSIS — Z23 ENCOUNTER FOR IMMUNIZATION: Primary | ICD-10-CM

## 2021-03-21 PROCEDURE — 91300 SARS-COV-2 / COVID-19 MRNA VACCINE (PFIZER-BIONTECH) 30 MCG: CPT

## 2021-03-21 PROCEDURE — 0001A SARS-COV-2 / COVID-19 MRNA VACCINE (PFIZER-BIONTECH) 30 MCG: CPT

## 2021-04-11 ENCOUNTER — IMMUNIZATIONS (OUTPATIENT)
Dept: FAMILY MEDICINE CLINIC | Facility: HOSPITAL | Age: 56
End: 2021-04-11

## 2021-04-11 DIAGNOSIS — Z23 ENCOUNTER FOR IMMUNIZATION: Primary | ICD-10-CM

## 2021-04-11 PROCEDURE — 91300 SARS-COV-2 / COVID-19 MRNA VACCINE (PFIZER-BIONTECH) 30 MCG: CPT

## 2021-04-11 PROCEDURE — 0002A SARS-COV-2 / COVID-19 MRNA VACCINE (PFIZER-BIONTECH) 30 MCG: CPT

## 2021-04-20 ENCOUNTER — TELEPHONE (OUTPATIENT)
Dept: FAMILY MEDICINE CLINIC | Facility: CLINIC | Age: 56
End: 2021-04-20

## 2021-04-20 NOTE — TELEPHONE ENCOUNTER
Was it a close contact meaning not wearing masks within 6 ft for greater than 15 minutes? Recommend home quarantine for 10 days from exposure  Recommend COVID testing if they develop symptoms

## 2021-04-20 NOTE — TELEPHONE ENCOUNTER
Pt called stating that she had her 2nd COVID vaccine on 4/11/21, and her  had his 2nd vaccine on 4/15/21  On Saturday, 4/17 they were with a family member that just tested + for COVID  Neither of them are currently having sx  She is asking your recommendations regarding this - do they need to quarantine/be tested, or just watch for sx? Please advise  Thank you

## 2021-04-27 ENCOUNTER — TELEPHONE (OUTPATIENT)
Dept: FAMILY MEDICINE CLINIC | Facility: CLINIC | Age: 56
End: 2021-04-27

## 2021-04-27 NOTE — TELEPHONE ENCOUNTER
Pt asking for a RTW letter, she was on quarantine for 10 days, and wants a letter to return 4/28   Fax to [phone]

## 2021-05-30 DIAGNOSIS — E03.9 HYPOTHYROIDISM, UNSPECIFIED TYPE: ICD-10-CM

## 2021-05-31 RX ORDER — LEVOTHYROXINE SODIUM 125 UG/1
TABLET ORAL
Qty: 90 TABLET | Refills: 0 | Status: SHIPPED | OUTPATIENT
Start: 2021-05-31 | End: 2021-11-08 | Stop reason: SDUPTHER

## 2021-07-12 ENCOUNTER — RA CDI HCC (OUTPATIENT)
Dept: OTHER | Facility: HOSPITAL | Age: 56
End: 2021-07-12

## 2021-07-12 NOTE — PROGRESS NOTES
[facility]  coding opportunities          Chart reviewed, no opportunity found: CHART REVIEWED, NO OPPORTUNITY FOUND                     Patients insurance company: [employer] (Medicare Advantage and Commercial)

## 2021-07-19 ENCOUNTER — TELEPHONE (OUTPATIENT)
Dept: GASTROENTEROLOGY | Facility: MEDICAL CENTER | Age: 56
End: 2021-07-19

## 2021-07-19 ENCOUNTER — ANNUAL EXAM (OUTPATIENT)
Dept: OBGYN CLINIC | Facility: CLINIC | Age: 56
End: 2021-07-19
Payer: COMMERCIAL

## 2021-07-19 VITALS
HEIGHT: 67 IN | SYSTOLIC BLOOD PRESSURE: 123 MMHG | WEIGHT: 206.2 LBS | BODY MASS INDEX: 32.36 KG/M2 | DIASTOLIC BLOOD PRESSURE: 84 MMHG

## 2021-07-19 DIAGNOSIS — Z86.010 HISTORY OF COLONIC POLYPS: ICD-10-CM

## 2021-07-19 DIAGNOSIS — Z90.710 STATUS POST LAPAROSCOPIC HYSTERECTOMY: ICD-10-CM

## 2021-07-19 DIAGNOSIS — Z90.79 STATUS POST BILATERAL SALPINGECTOMY: ICD-10-CM

## 2021-07-19 DIAGNOSIS — Z86.39 HISTORY OF HYPOTHYROIDISM: ICD-10-CM

## 2021-07-19 DIAGNOSIS — Z90.721 STATUS POST LEFT OOPHORECTOMY: ICD-10-CM

## 2021-07-19 DIAGNOSIS — Z01.419 WOMEN'S ANNUAL ROUTINE GYNECOLOGICAL EXAMINATION: ICD-10-CM

## 2021-07-19 DIAGNOSIS — Z92.89 HISTORY OF MAMMOGRAM: ICD-10-CM

## 2021-07-19 PROCEDURE — 99396 PREV VISIT EST AGE 40-64: CPT | Performed by: OBSTETRICS & GYNECOLOGY

## 2021-07-19 NOTE — PROGRESS NOTES
Assessment     Annual well-woman exam    Status post laparoscopic hysterectomy with bilateral salpingectomy and left oophorectomy    History of colonic polyps, due for colonoscopy    History of hypothyroidism        Plan     Pap smear no longer indicated     Referral to Gastroenterology for colonoscopy    Thyroid medication by PCP   All questions answered  Subjective   Here for annual exam     Nel Ochoa is a 60 y o  female who presents for annual exam  Periods are  Absent since her hysterectomy many years ago  She was last seen here 3 years ago  No new gyn complaints or concerns  She does have history of colonic polyps and is due for follow-up colonoscopy  No further pelvic pain symptoms, she is still sexually active, denies hot flashes or night sweats  Denies problems with her bowels or bladder  The patient reports that there is not domestic violence in her life  Current contraception: status post hysterectomy  History of abnormal Pap smear: no  Family history of uterine or ovarian cancer: no  Regular self breast exam: yes  History of abnormal mammogram: no  Family history of breast cancer: no  History of abnormal lipids: no  Menstrual History:  OB History    No obstetric history on file  Menarche age: 13  No LMP recorded (lmp unknown)  Patient has had a hysterectomy  Review of Systems  Pertinent items are noted in HPI        Objective   No acute distress   /84 (BP Location: Left arm, Patient Position: Sitting, Cuff Size: Standard)   Ht 5' 7" (1 702 m)   Wt 93 5 kg (206 lb 3 2 oz)   LMP  (LMP Unknown)   BMI 32 30 kg/m²     General:   alert and oriented, in no acute distress, alert, appears stated age and cooperative   Heart: regular rate and rhythm, S1, S2 normal, no murmur, click, rub or gallop   Lungs: clear to auscultation bilaterally   Abdomen: soft, non-tender, without masses or organomegaly   Vulva: normal, Bartholin's, Urethra, [place]'s normal, female escutcheon Vagina: normal mucosa, normal discharge, no palpable nodules   Cervix: surgically absent   Uterus: surgically absent   Adnexa: surgically absent   Bilateral breast exam in the sitting and supine position with chaperone present, no visible or palpable breast lesions identified  No breast masses noted  No supraclavicular or axillary lymphadenopathy noted  No nipple discharge  Reviewed self-breast exam techniques     Rectal exam,  deferred

## 2021-07-20 ENCOUNTER — TELEPHONE (OUTPATIENT)
Dept: GASTROENTEROLOGY | Facility: CLINIC | Age: 56
End: 2021-07-20

## 2021-07-20 NOTE — TELEPHONE ENCOUNTER
Colonoscopy rescheduled for 11/05/21 @[facility] with Dr Niles Lara/miralax instructions given to pt verbally/mailed

## 2021-07-20 NOTE — TELEPHONE ENCOUNTER
Colonoscopy scheduled 09/20/21 @[facility] with Dr Allison Gay    Dulcolax/miralax instructions given to pt verbally/mailed

## 2021-08-02 ENCOUNTER — RA CDI HCC (OUTPATIENT)
Dept: OTHER | Facility: HOSPITAL | Age: 56
End: 2021-08-02

## 2021-08-09 ENCOUNTER — OFFICE VISIT (OUTPATIENT)
Dept: FAMILY MEDICINE CLINIC | Facility: CLINIC | Age: 56
End: 2021-08-09
Payer: COMMERCIAL

## 2021-08-09 ENCOUNTER — TELEPHONE (OUTPATIENT)
Dept: ADMINISTRATIVE | Facility: OTHER | Age: 56
End: 2021-08-09

## 2021-08-09 VITALS
WEIGHT: 204 LBS | DIASTOLIC BLOOD PRESSURE: 72 MMHG | OXYGEN SATURATION: 99 % | HEIGHT: 67 IN | TEMPERATURE: 98 F | SYSTOLIC BLOOD PRESSURE: 110 MMHG | BODY MASS INDEX: 32.02 KG/M2 | RESPIRATION RATE: 16 BRPM | HEART RATE: 64 BPM

## 2021-08-09 DIAGNOSIS — E55.9 VITAMIN D DEFICIENCY: ICD-10-CM

## 2021-08-09 DIAGNOSIS — Z00.00 ANNUAL PHYSICAL EXAM: Primary | ICD-10-CM

## 2021-08-09 DIAGNOSIS — E03.9 HYPOTHYROIDISM, UNSPECIFIED TYPE: ICD-10-CM

## 2021-08-09 LAB
25(OH)D3 SERPL-MCNC: 28 NG/ML (ref 30–100)
ALBUMIN SERPL BCP-MCNC: 3.7 G/DL (ref 3.5–5)
ALP SERPL-CCNC: 81 U/L (ref 46–116)
ALT SERPL W P-5'-P-CCNC: 25 U/L (ref 12–78)
ANION GAP SERPL CALCULATED.3IONS-SCNC: 6 MMOL/L (ref 4–13)
AST SERPL W P-5'-P-CCNC: 16 U/L (ref 5–45)
BILIRUB SERPL-MCNC: 1.21 MG/DL (ref 0.2–1)
BILIRUB UR QL STRIP: NEGATIVE
BUN SERPL-MCNC: 13 MG/DL (ref 5–25)
CALCIUM SERPL-MCNC: 9.4 MG/DL (ref 8.3–10.1)
CHLORIDE SERPL-SCNC: 109 MMOL/L (ref 100–108)
CHOLEST SERPL-MCNC: 206 MG/DL (ref 50–200)
CLARITY UR: CLEAR
CO2 SERPL-SCNC: 26 MMOL/L (ref 21–32)
COLOR UR: YELLOW
CREAT SERPL-MCNC: 0.57 MG/DL (ref 0.6–1.3)
ERYTHROCYTE [DISTWIDTH] IN BLOOD BY AUTOMATED COUNT: 12.4 % (ref 11.6–15.1)
GFR SERPL CREATININE-BSD FRML MDRD: 103 ML/MIN/1.73SQ M
GLUCOSE P FAST SERPL-MCNC: 92 MG/DL (ref 65–99)
GLUCOSE UR STRIP-MCNC: NEGATIVE MG/DL
HCT VFR BLD AUTO: 45.1 % (ref 34.8–46.1)
HDLC SERPL-MCNC: 73 MG/DL
HGB BLD-MCNC: 14.1 G/DL (ref 11.5–15.4)
HGB UR QL STRIP.AUTO: NEGATIVE
KETONES UR STRIP-MCNC: NEGATIVE MG/DL
LDLC SERPL CALC-MCNC: 120 MG/DL (ref 0–100)
LEUKOCYTE ESTERASE UR QL STRIP: NEGATIVE
MCH RBC QN AUTO: 28.2 PG (ref 26.8–34.3)
MCHC RBC AUTO-ENTMCNC: 31.3 G/DL (ref 31.4–37.4)
MCV RBC AUTO: 90 FL (ref 82–98)
NITRITE UR QL STRIP: NEGATIVE
NONHDLC SERPL-MCNC: 133 MG/DL
PH UR STRIP.AUTO: 6 [PH]
PLATELET # BLD AUTO: 274 THOUSANDS/UL (ref 149–390)
PMV BLD AUTO: 10.4 FL (ref 8.9–12.7)
POTASSIUM SERPL-SCNC: 4.9 MMOL/L (ref 3.5–5.3)
PROT SERPL-MCNC: 7.1 G/DL (ref 6.4–8.2)
PROT UR STRIP-MCNC: NEGATIVE MG/DL
RBC # BLD AUTO: 5 MILLION/UL (ref 3.81–5.12)
SODIUM SERPL-SCNC: 141 MMOL/L (ref 136–145)
SP GR UR STRIP.AUTO: 1.02 (ref 1–1.03)
TRIGL SERPL-MCNC: 63 MG/DL
TSH SERPL DL<=0.05 MIU/L-ACNC: 0.51 UIU/ML (ref 0.36–3.74)
UROBILINOGEN UR QL STRIP.AUTO: 0.2 E.U./DL
WBC # BLD AUTO: 6.24 THOUSAND/UL (ref 4.31–10.16)

## 2021-08-09 PROCEDURE — 3725F SCREEN DEPRESSION PERFORMED: CPT | Performed by: FAMILY MEDICINE

## 2021-08-09 PROCEDURE — 3008F BODY MASS INDEX DOCD: CPT | Performed by: FAMILY MEDICINE

## 2021-08-09 PROCEDURE — 1036F TOBACCO NON-USER: CPT | Performed by: FAMILY MEDICINE

## 2021-08-09 PROCEDURE — 81003 URINALYSIS AUTO W/O SCOPE: CPT | Performed by: FAMILY MEDICINE

## 2021-08-09 PROCEDURE — 85027 COMPLETE CBC AUTOMATED: CPT | Performed by: FAMILY MEDICINE

## 2021-08-09 PROCEDURE — 80053 COMPREHEN METABOLIC PANEL: CPT | Performed by: FAMILY MEDICINE

## 2021-08-09 PROCEDURE — 36415 COLL VENOUS BLD VENIPUNCTURE: CPT | Performed by: FAMILY MEDICINE

## 2021-08-09 PROCEDURE — 82306 VITAMIN D 25 HYDROXY: CPT | Performed by: FAMILY MEDICINE

## 2021-08-09 PROCEDURE — 99396 PREV VISIT EST AGE 40-64: CPT | Performed by: FAMILY MEDICINE

## 2021-08-09 PROCEDURE — 80061 LIPID PANEL: CPT | Performed by: FAMILY MEDICINE

## 2021-08-09 PROCEDURE — 84443 ASSAY THYROID STIM HORMONE: CPT | Performed by: FAMILY MEDICINE

## 2021-08-09 NOTE — PROGRESS NOTES
Assessment/Plan:    Fasting labs drawn as below  Patient to continue present treatment  Patient instructed to follow a low-fat and a low-carbohydrate diet and get regular aerobic exercise 150 minutes per week  Return to the office in 6 months  Diagnoses and all orders for this visit:    Annual physical exam  -     CBC and Platelet  -     Comprehensive metabolic panel  -     Lipid panel  -     UA w Reflex to Microscopic w Reflex to Culture - Clinic Collect    Hypothyroidism, unspecified type  -     TSH, 3rd generation with Free T4 reflex    Vitamin D deficiency  -     Vitamin D 25 hydroxy          Subjective:      Patient ID: Jeramie Black is a 60 y o  female  Patient is here for annual physical exam and follow-up of chronic conditions and she requests fasting labs  Patient has been feeling well overall and remains active walking a few times a week  Patient is up-to-date on mammogram and scheduled for colonoscopy on 11/05/2021  Thyroid Problem  Presents for follow-up visit  Patient reports no anxiety, cold intolerance, constipation, depressed mood, diaphoresis, diarrhea, dry skin, fatigue, hair loss, heat intolerance, hoarse voice, leg swelling, palpitations, tremors, visual change, weight gain or weight loss  The symptoms have been stable  The following portions of the patient's history were reviewed and updated as appropriate: allergies, current medications, past family history, past medical history, past social history, past surgical history and problem list     Review of Systems   Constitutional: Negative for activity change, appetite change, chills, diaphoresis, fatigue, fever, unexpected weight change, weight gain and weight loss  HENT: Negative  Negative for hoarse voice  Eyes: Negative  Respiratory: Negative for cough, chest tightness, shortness of breath and wheezing  Cardiovascular: Negative for chest pain, palpitations and leg swelling     Gastrointestinal: Negative for abdominal pain, blood in stool, constipation, diarrhea, nausea and vomiting  Endocrine: Negative for cold intolerance and heat intolerance  Genitourinary: Negative for difficulty urinating, dysuria, frequency, hematuria and urgency  Musculoskeletal: Negative for arthralgias, back pain, gait problem, joint swelling, myalgias, neck pain and neck stiffness  Skin: Negative  Neurological: Negative for dizziness, tremors, syncope, weakness, light-headedness and headaches  Hematological: Negative for adenopathy  Does not bruise/bleed easily  Psychiatric/Behavioral: Negative for decreased concentration, dysphoric mood and sleep disturbance  The patient is not nervous/anxious  Objective:      /72   Pulse 64   Temp 98 °F (36 7 °C) (Tympanic)   Resp 16   Ht 5' 7" (1 702 m)   Wt 92 5 kg (204 lb)   LMP  (LMP Unknown)   SpO2 99%   BMI 31 95 kg/m²          Physical Exam  Constitutional:       General: She is not in acute distress  Appearance: Normal appearance  HENT:      Head: Normocephalic  Eyes:      General: No scleral icterus  Conjunctiva/sclera: Conjunctivae normal    Neck:      Vascular: No carotid bruit  Cardiovascular:      Rate and Rhythm: Normal rate and regular rhythm  Pulmonary:      Effort: Pulmonary effort is normal       Breath sounds: Normal breath sounds  Abdominal:      Palpations: Abdomen is soft  Tenderness: There is no abdominal tenderness  Musculoskeletal:      Cervical back: Neck supple  Right lower leg: No edema  Left lower leg: No edema  Lymphadenopathy:      Cervical: No cervical adenopathy  Skin:     General: Skin is warm and dry  Neurological:      General: No focal deficit present  Mental Status: She is alert and oriented to person, place, and time  Psychiatric:         Mood and Affect: Mood normal          Behavior: Behavior normal          Thought Content:  Thought content normal          Judgment: Judgment normal          BMI Counseling: Body mass index is 31 95 kg/m²  The BMI is above normal  Nutrition recommendations include reducing portion sizes, decreasing overall calorie intake, 3-5 servings of fruits/vegetables daily, reducing fast food intake, consuming healthier snacks, decreasing soda and/or juice intake, moderation in carbohydrate intake, increasing intake of lean protein, reducing intake of saturated fat and trans fat and reducing intake of cholesterol  Exercise recommendations include moderate aerobic physical activity for 150 minutes/week

## 2021-08-09 NOTE — TELEPHONE ENCOUNTER
Upon review of the In Basket request we were able to locate, review, and update the patient chart as requested for Mammogram     Any additional questions or concerns should be emailed to the Practice Liaisons via [email or web address] email, please do not reply via In Basket      Thank you  Verónica Ontiveros

## 2021-08-09 NOTE — TELEPHONE ENCOUNTER
----- Message from Rios Mckeon sent at 2021  2:15 PM EDT -----  [facility] S  21 2:16 PM    Hello, our patient Nel Ochoa has had Mammogram completed/performed  Please assist in updating the patient chart by pulling the Care Everywhere (CE) document  The date of service is 10/12/2020       Thank you,  Holly Moya MA  [facility]

## 2021-08-10 ENCOUNTER — TELEPHONE (OUTPATIENT)
Dept: FAMILY MEDICINE CLINIC | Facility: CLINIC | Age: 56
End: 2021-08-10

## 2021-08-10 DIAGNOSIS — H92.09 OTALGIA, UNSPECIFIED LATERALITY: Primary | ICD-10-CM

## 2021-08-10 RX ORDER — AMOXICILLIN 500 MG/1
500 CAPSULE ORAL EVERY 8 HOURS SCHEDULED
Qty: 30 CAPSULE | Refills: 0 | Status: SHIPPED | OUTPATIENT
Start: 2021-08-10 | End: 2021-08-20

## 2021-08-10 NOTE — TELEPHONE ENCOUNTER
Pt calling stating that she had a mild throbbing in her L ear that started last night  Pt states that if she swallows on L side it feels swollen and a little painful  Can you send an antibiotic to her pharmacy? Pt uses [facility]   Pt was seen yesterday for physical

## 2021-08-19 ENCOUNTER — TELEPHONE (OUTPATIENT)
Dept: FAMILY MEDICINE CLINIC | Facility: CLINIC | Age: 56
End: 2021-08-19

## 2021-08-24 ENCOUNTER — TELEPHONE (OUTPATIENT)
Dept: OBGYN CLINIC | Facility: CLINIC | Age: 56
End: 2021-08-24

## 2021-08-24 DIAGNOSIS — Z12.31 SCREENING MAMMOGRAM, ENCOUNTER FOR: Primary | ICD-10-CM

## 2021-08-24 NOTE — TELEPHONE ENCOUNTER
----- Message from [facility], DO sent at 8/23/2021 10:45 AM EDT -----  Regarding: Mammogram   Please notify patient she forgot to have her mammogram completed  Extension date has been granted through the end of September  We can extend longer if necessary  Please remind patient to call and schedule her mammogram at her earliest convenience

## 2021-08-24 NOTE — TELEPHONE ENCOUNTER
Called patient to notify  Patient states she scheduled her mammogram but the soonest appointment she could get is 11/5/21  She goes to [facility] [address]  New order entered with extended date

## 2021-10-06 NOTE — TELEPHONE ENCOUNTER
Patient rescheduled procedure on 12/13 at [facility] with Dr Dalton Russo/Jane instructions mailed

## 2021-10-15 ENCOUNTER — TELEPHONE (OUTPATIENT)
Dept: OBGYN CLINIC | Facility: CLINIC | Age: 56
End: 2021-10-15

## 2021-10-16 ENCOUNTER — IMMUNIZATIONS (OUTPATIENT)
Dept: FAMILY MEDICINE CLINIC | Facility: HOSPITAL | Age: 56
End: 2021-10-16

## 2021-10-16 DIAGNOSIS — Z23 ENCOUNTER FOR IMMUNIZATION: Primary | ICD-10-CM

## 2021-10-16 PROCEDURE — 91300 SARS-COV-2 / COVID-19 MRNA VACCINE (PFIZER-BIONTECH) 30 MCG: CPT

## 2021-10-16 PROCEDURE — 0001A SARS-COV-2 / COVID-19 MRNA VACCINE (PFIZER-BIONTECH) 30 MCG: CPT

## 2021-11-08 DIAGNOSIS — E03.9 HYPOTHYROIDISM, UNSPECIFIED TYPE: ICD-10-CM

## 2021-11-08 RX ORDER — LEVOTHYROXINE SODIUM 125 UG/1
125 TABLET ORAL DAILY
Qty: 90 TABLET | Refills: 3 | Status: SHIPPED | OUTPATIENT
Start: 2021-11-08

## 2021-11-18 ENCOUNTER — TELEPHONE (OUTPATIENT)
Dept: OBGYN CLINIC | Facility: CLINIC | Age: 56
End: 2021-11-18

## 2021-12-10 ENCOUNTER — TELEPHONE (OUTPATIENT)
Dept: GASTROENTEROLOGY | Facility: AMBULARY SURGERY CENTER | Age: 56
End: 2021-12-10

## 2021-12-13 ENCOUNTER — HOSPITAL ENCOUNTER (OUTPATIENT)
Dept: GASTROENTEROLOGY | Facility: AMBULARY SURGERY CENTER | Age: 56
Setting detail: OUTPATIENT SURGERY
Discharge: HOME | End: 2021-12-13
Attending: INTERNAL MEDICINE | Admitting: INTERNAL MEDICINE
Payer: COMMERCIAL

## 2021-12-13 ENCOUNTER — ANESTHESIA EVENT (OUTPATIENT)
Dept: GASTROENTEROLOGY | Facility: AMBULARY SURGERY CENTER | Age: 56
End: 2021-12-13

## 2021-12-13 ENCOUNTER — ANESTHESIA (OUTPATIENT)
Dept: GASTROENTEROLOGY | Facility: AMBULARY SURGERY CENTER | Age: 56
End: 2021-12-13

## 2021-12-13 VITALS
HEART RATE: 64 BPM | TEMPERATURE: 96.6 F | RESPIRATION RATE: 16 BRPM | DIASTOLIC BLOOD PRESSURE: 59 MMHG | HEIGHT: 67 IN | BODY MASS INDEX: 31.39 KG/M2 | SYSTOLIC BLOOD PRESSURE: 108 MMHG | OXYGEN SATURATION: 100 % | WEIGHT: 200 LBS

## 2021-12-13 DIAGNOSIS — Z12.11 SPECIAL SCREENING FOR MALIGNANT NEOPLASMS, COLON: ICD-10-CM

## 2021-12-13 PROCEDURE — 45385 COLONOSCOPY W/LESION REMOVAL: CPT | Performed by: INTERNAL MEDICINE

## 2021-12-13 PROCEDURE — 88305 TISSUE EXAM BY PATHOLOGIST: CPT | Performed by: PATHOLOGY

## 2021-12-13 RX ORDER — PROPOFOL 10 MG/ML
INJECTION, EMULSION INTRAVENOUS AS NEEDED
Status: DISCONTINUED | OUTPATIENT
Start: 2021-12-13 | End: 2021-12-13

## 2021-12-13 RX ORDER — SODIUM CHLORIDE, SODIUM LACTATE, POTASSIUM CHLORIDE, CALCIUM CHLORIDE 600; 310; 30; 20 MG/100ML; MG/100ML; MG/100ML; MG/100ML
125 INJECTION, SOLUTION INTRAVENOUS CONTINUOUS
Status: DISCONTINUED | OUTPATIENT
Start: 2021-12-13 | End: 2021-12-17 | Stop reason: HOSPADM

## 2021-12-13 RX ADMIN — PROPOFOL 20 MG: 10 INJECTION, EMULSION INTRAVENOUS at 07:47

## 2021-12-13 RX ADMIN — PROPOFOL 140 MG: 10 INJECTION, EMULSION INTRAVENOUS at 07:34

## 2021-12-13 RX ADMIN — PROPOFOL 30 MG: 10 INJECTION, EMULSION INTRAVENOUS at 07:38

## 2021-12-13 RX ADMIN — PROPOFOL 30 MG: 10 INJECTION, EMULSION INTRAVENOUS at 07:45

## 2021-12-13 RX ADMIN — PROPOFOL 30 MG: 10 INJECTION, EMULSION INTRAVENOUS at 07:50

## 2021-12-13 RX ADMIN — PROPOFOL 30 MG: 10 INJECTION, EMULSION INTRAVENOUS at 07:40

## 2021-12-13 RX ADMIN — PROPOFOL 30 MG: 10 INJECTION, EMULSION INTRAVENOUS at 07:37

## 2021-12-13 RX ADMIN — SODIUM CHLORIDE, SODIUM LACTATE, POTASSIUM CHLORIDE, AND CALCIUM CHLORIDE: .6; .31; .03; .02 INJECTION, SOLUTION INTRAVENOUS at 07:31

## 2021-12-13 RX ADMIN — PROPOFOL 40 MG: 10 INJECTION, EMULSION INTRAVENOUS at 07:43

## 2021-12-13 RX ADMIN — PROPOFOL 20 MG: 10 INJECTION, EMULSION INTRAVENOUS at 07:52

## 2021-12-13 RX ADMIN — Medication 40 MG: at 07:37

## 2021-12-13 RX ADMIN — PROPOFOL 30 MG: 10 INJECTION, EMULSION INTRAVENOUS at 07:35

## 2021-12-22 ENCOUNTER — TELEPHONE (OUTPATIENT)
Dept: OBGYN CLINIC | Facility: CLINIC | Age: 56
End: 2021-12-22

## 2021-12-22 ENCOUNTER — NURSE TRIAGE (OUTPATIENT)
Dept: OTHER | Facility: OTHER | Age: 56
End: 2021-12-22

## 2021-12-27 ENCOUNTER — TELEPHONE (OUTPATIENT)
Dept: OBGYN CLINIC | Facility: CLINIC | Age: 56
End: 2021-12-27

## 2022-04-15 ENCOUNTER — HOSPITAL ENCOUNTER (OUTPATIENT)
Dept: RADIOLOGY | Age: 57
Discharge: HOME | End: 2022-04-15
Payer: COMMERCIAL

## 2022-04-15 VITALS — HEIGHT: 67 IN | WEIGHT: 200 LBS | BODY MASS INDEX: 31.39 KG/M2

## 2022-04-15 DIAGNOSIS — Z12.31 SCREENING MAMMOGRAM, ENCOUNTER FOR: ICD-10-CM

## 2022-04-15 PROCEDURE — 77063 BREAST TOMOSYNTHESIS BI: CPT

## 2022-04-15 PROCEDURE — 77067 SCR MAMMO BI INCL CAD: CPT

## 2022-04-20 ENCOUNTER — TELEPHONE (OUTPATIENT)
Dept: FAMILY MEDICINE CLINIC | Facility: CLINIC | Age: 57
End: 2022-04-20

## 2022-04-20 NOTE — TELEPHONE ENCOUNTER
Pt called in requesting Dr Abbey Armstrong reviews care everywhere US abdomen complete from 4/19/2022  Please advise

## 2022-08-08 ENCOUNTER — ESTABLISHED COMPREHENSIVE EXAM (OUTPATIENT)
Dept: URBAN - METROPOLITAN AREA CLINIC 6 | Facility: CLINIC | Age: 57
End: 2022-08-08

## 2022-08-08 DIAGNOSIS — H40.033: ICD-10-CM

## 2022-08-08 PROCEDURE — 92015 DETERMINE REFRACTIVE STATE: CPT

## 2022-08-08 PROCEDURE — 92014 COMPRE OPH EXAM EST PT 1/>: CPT

## 2022-08-08 ASSESSMENT — TONOMETRY
OD_IOP_MMHG: 12
OS_IOP_MMHG: 17 POST DILATION
OS_IOP_MMHG: 12

## 2022-08-08 ASSESSMENT — VISUAL ACUITY
OS_CC: 20/100
OS_PH: 20/50
OD_CC: 20/200
OU_CC: J2
OD_PH: 20/50-1

## 2022-09-14 ENCOUNTER — OFFICE VISIT (OUTPATIENT)
Dept: OBGYN CLINIC | Facility: CLINIC | Age: 57
End: 2022-09-14
Payer: COMMERCIAL

## 2022-09-14 VITALS
DIASTOLIC BLOOD PRESSURE: 60 MMHG | SYSTOLIC BLOOD PRESSURE: 110 MMHG | HEIGHT: 67 IN | BODY MASS INDEX: 32.33 KG/M2 | WEIGHT: 206 LBS

## 2022-09-14 DIAGNOSIS — N30.00 ACUTE CYSTITIS WITHOUT HEMATURIA: Primary | ICD-10-CM

## 2022-09-14 DIAGNOSIS — R30.0 DYSURIA: ICD-10-CM

## 2022-09-14 DIAGNOSIS — N95.2 VAGINAL ATROPHY: ICD-10-CM

## 2022-09-14 LAB
SL AMB  POCT GLUCOSE, UA: NEGATIVE
SL AMB LEUKOCYTE ESTERASE,UA: NEGATIVE
SL AMB POCT BILIRUBIN,UA: NEGATIVE
SL AMB POCT BLOOD,UA: ABNORMAL
SL AMB POCT CLARITY,UA: CLEAR
SL AMB POCT COLOR,UA: YELLOW
SL AMB POCT KETONES,UA: NEGATIVE
SL AMB POCT NITRITE,UA: NEGATIVE
SL AMB POCT PH,UA: 5.5
SL AMB POCT SPECIFIC GRAVITY,UA: 1.01
SL AMB POCT URINE PROTEIN: NEGATIVE
SL AMB POCT UROBILINOGEN: NEGATIVE

## 2022-09-14 PROCEDURE — 87186 SC STD MICRODIL/AGAR DIL: CPT | Performed by: OBSTETRICS & GYNECOLOGY

## 2022-09-14 PROCEDURE — 99214 OFFICE O/P EST MOD 30 MIN: CPT | Performed by: OBSTETRICS & GYNECOLOGY

## 2022-09-14 PROCEDURE — 81002 URINALYSIS NONAUTO W/O SCOPE: CPT | Performed by: OBSTETRICS & GYNECOLOGY

## 2022-09-14 PROCEDURE — 87086 URINE CULTURE/COLONY COUNT: CPT | Performed by: OBSTETRICS & GYNECOLOGY

## 2022-09-14 PROCEDURE — 87077 CULTURE AEROBIC IDENTIFY: CPT | Performed by: OBSTETRICS & GYNECOLOGY

## 2022-09-14 RX ORDER — CONJUGATED ESTROGENS 0.62 MG/G
CREAM VAGINAL
Qty: 30 G | Refills: 1 | Status: SHIPPED | OUTPATIENT
Start: 2022-09-14

## 2022-09-14 RX ORDER — PHENAZOPYRIDINE HYDROCHLORIDE 200 MG/1
200 TABLET, FILM COATED ORAL EVERY 8 HOURS PRN
Qty: 9 TABLET | Refills: 0 | Status: SHIPPED | OUTPATIENT
Start: 2022-09-14

## 2022-09-14 RX ORDER — SULFAMETHOXAZOLE AND TRIMETHOPRIM 800; 160 MG/1; MG/1
1 TABLET ORAL EVERY 12 HOURS SCHEDULED
Qty: 6 TABLET | Refills: 0 | Status: SHIPPED | OUTPATIENT
Start: 2022-09-14 | End: 2022-09-17

## 2022-09-14 NOTE — PROGRESS NOTES
Assessment/Plan    Diagnoses and all orders for this visit:    Acute cystitis without hematuria  -     sulfamethoxazole-trimethoprim (BACTRIM DS) 800-160 mg per tablet; Take 1 tablet by mouth every 12 (twelve) hours for 3 days  -     phenazopyridine (PYRIDIUM) 200 mg tablet; Take 1 tablet (200 mg total) by mouth every 8 (eight) hours as needed for bladder spasms    Encouraged to call if symptoms do not improve or worsen over the next week    Dysuria  -     POCT urine dip  -     Cancel: Urine culture; Future  -     Urine culture    Vaginal atrophy  -     conjugated estrogens (Premarin) vaginal cream; Use a pea-size amount qhs x 2 weeks, then every other night    -Reviewed risks/ benefit/ alternatives to treatment  Pt  To call with any questions/ concerns  Will follow up for ELYSSA Donnelly     Millicent Alicea is a 59 y o  female here for a problem visit  Patient is complaining of symptoms for the past few days  States she voids, then feels as though she needs to void again but only a little urine comes out  Has discomfort upon emptying her bladder  Denies any hematuria but noted some very light pink when she wiped this morning  No hx of UTI or kidney stones  Pt  Also has concerns with vaginal dryness  States intercourse is very uncomfortable  She is using organic/ natural lubricants without relief  Discussed HRT in the past but never tried  Hx of TLH/ USO for endometriosis/ fibroids           Patient Active Problem List   Diagnosis    Musculoskeletal neck pain    Allergic rhinitis    Asthma    Disc degeneration    GERD (gastroesophageal reflux disease)    Hemorrhoids    Hypothyroidism    Pelvic pain syndrome    Temporomandibular disorder    Thyroid nodule    Vaginal dryness, menopausal    Vitamin D deficiency    Anxiety state    Family history of ischemic heart disease (IHD)    Insomnia    Menopausal and postmenopausal disorder    Symptomatic menopausal or female climacteric states         Gynecologic History  No LMP recorded (lmp unknown)  Patient has had a hysterectomy    Contraception: post menopausal status  Last Pap: 2018- NILM     Obstetric History  OB History    Para Term  AB Living   1 1 1         SAB IAB Ectopic Multiple Live Births                  # Outcome Date GA Lbr Lopez/2nd Weight Sex Delivery Anes PTL Lv   1 Term                  Past Medical History:   Diagnosis Date    Colon polyp     Disease of thyroid gland     Endometriosis     Fibroids, subserous     Hypothyroidism        Past Surgical History:   Procedure Laterality Date    CHOLECYSTECTOMY      COLONOSCOPY      per pt    HYSTERECTOMY      right ovary removed also    OOPHORECTOMY Right 2008    TUBAL LIGATION           Family History   Problem Relation Age of Onset    No Known Problems Mother     Hypertension Father     Kidney disease Father     Heart disease Sister         CARDIAC DISORDER    Diabetes Sister         MELLITUS    Hypothyroidism Sister         HYPOTHYROIDISM, GOITROUS    No Known Problems Daughter     No Known Problems Maternal Grandmother     No Known Problems Maternal Grandfather     No Known Problems Paternal Grandmother     No Known Problems Paternal Grandfather     No Known Problems Sister     No Known Problems Maternal Aunt     No Known Problems Maternal Aunt     No Known Problems Maternal Aunt     No Known Problems Maternal Aunt     No Known Problems Maternal Aunt        Social History     Socioeconomic History    Marital status: /Civil Union     Spouse name: Not on file    Number of children: 1    Years of education: Not on file    Highest education level: Not on file   Occupational History    Not on file   Tobacco Use    Smoking status: Former Smoker    Smokeless tobacco: Never Used    Tobacco comment: quit 2004   Vaping Use    Vaping Use: Never used   Substance and Sexual Activity    Alcohol use: Yes     Comment[facility]    Drug use: No    Sexual activity: Yes     Partners: Male     Birth control/protection: Post-menopausal   Other Topics Concern    Not on file   Social History Narrative    ALWAYS USES SEAT BELT    CAFFEINE USE     Social Determinants of Health     Financial Resource Strain: Not on file   Food Insecurity: Not on file   Transportation Needs: Not on file   Physical Activity: Not on file   Stress: Not on file   Social Connections: Not on file   Intimate Partner Violence: Not on file   Housing Stability: Not on file       Allergies  Oxycodone and Oxycodone-acetaminophen er    Medications    Current Outpatient Medications:     Cholecalciferol (VITAMIN D3) 1000 units CAPS, Take 2 capsules by mouth daily, Disp: , Rfl:     conjugated estrogens (Premarin) vaginal cream, Use a pea-size amount qhs x 2 weeks, then every other night, Disp: 30 g, Rfl: 1    phenazopyridine (PYRIDIUM) 200 mg tablet, Take 1 tablet (200 mg total) by mouth every 8 (eight) hours as needed for bladder spasms, Disp: 9 tablet, Rfl: 0    sulfamethoxazole-trimethoprim (BACTRIM DS) 800-160 mg per tablet, Take 1 tablet by mouth every 12 (twelve) hours for 3 days, Disp: 6 tablet, Rfl: 0    Synthroid 125 MCG tablet, Take 1 tablet (125 mcg total) by mouth daily, Disp: 90 tablet, Rfl: 3      Review of Systems  Review of Systems   Constitutional: Negative for activity change, chills, fever and unexpected weight change  HENT: Negative for congestion, ear pain, hearing loss and sore throat  Respiratory: Negative for cough, chest tightness and shortness of breath  Cardiovascular: Negative for chest pain and leg swelling  Gastrointestinal: Negative for abdominal pain, constipation, diarrhea, nausea and vomiting  Endocrine: Negative for polydipsia, polyphagia and polyuria  Genitourinary: Positive for dysuria  Negative for difficulty urinating, frequency, menstrual problem, pelvic pain, vaginal discharge and vaginal pain  See HPI    Skin: Negative for color change and rash  Neurological: Negative for dizziness, numbness and headaches  Psychiatric/Behavioral: Negative for agitation and confusion  Objective     /60 (BP Location: Right arm, Patient Position: Sitting, Cuff Size: Standard)   Ht 5' 7" (1 702 m)   Wt 93 4 kg (206 lb)   LMP  (LMP Unknown)   BMI 32 26 kg/m²       Physical Exam  Constitutional:       General: She is not in acute distress  Appearance: Normal appearance  She is obese  Genitourinary:      Vulva, bladder, rectum and urethral meatus normal       No lesions in the vagina  Right Labia: No rash, tenderness or lesions  Left Labia: No tenderness, lesions or rash  No labial fusion noted  Vaginal cuff intact  No vaginal discharge, tenderness, bleeding or ulceration  No vaginal prolapse present  Moderate vaginal atrophy present  Cervix is absent  Uterus is absent  HENT:      Head: Normocephalic and atraumatic  Nose: Nose normal    Eyes:      Conjunctiva/sclera: Conjunctivae normal       Pupils: Pupils are equal, round, and reactive to light  Cardiovascular:      Rate and Rhythm: Normal rate  Pulses: Normal pulses  Pulmonary:      Effort: Pulmonary effort is normal    Abdominal:      General: Abdomen is flat  Palpations: Abdomen is soft  Musculoskeletal:         General: Normal range of motion  Cervical back: Normal range of motion  Neurological:      General: No focal deficit present  Mental Status: She is alert and oriented to person, place, and time  Mental status is at baseline  Skin:     General: Skin is warm and dry  Psychiatric:         Mood and Affect: Mood normal          Behavior: Behavior normal          Thought Content: Thought content normal          Judgment: Judgment normal    Vitals and nursing note reviewed

## 2022-09-15 ENCOUNTER — TELEMEDICINE (OUTPATIENT)
Dept: FAMILY MEDICINE CLINIC | Facility: CLINIC | Age: 57
End: 2022-09-15
Payer: COMMERCIAL

## 2022-09-15 VITALS — HEIGHT: 67 IN | TEMPERATURE: 98.1 F | BODY MASS INDEX: 32.33 KG/M2 | WEIGHT: 206 LBS

## 2022-09-15 DIAGNOSIS — U07.1 COVID-19 VIRUS INFECTION: Primary | ICD-10-CM

## 2022-09-15 PROCEDURE — 3725F SCREEN DEPRESSION PERFORMED: CPT | Performed by: FAMILY MEDICINE

## 2022-09-15 PROCEDURE — 99213 OFFICE O/P EST LOW 20 MIN: CPT | Performed by: FAMILY MEDICINE

## 2022-09-15 RX ORDER — NIRMATRELVIR AND RITONAVIR 300-100 MG
3 KIT ORAL 2 TIMES DAILY
Qty: 30 TABLET | Refills: 0 | Status: SHIPPED | OUTPATIENT
Start: 2022-09-15 | End: 2022-09-20

## 2022-09-15 NOTE — PROGRESS NOTES
Virtual Regular Visit    Verification of patient location:    Patient is located in the following state in which I hold an active license [place]      Assessment/Plan:  Discussed treatment options with patient and she requests starting on Paxlovid  Discussed potential side effects and drug interactions  Patient remain on home isolation for 5 days and then mask for additional 5 days  Patient instructed to take vitamin-C 1000 mg vitamin-D 2000 International Units and zinc 200 mg daily  She may take Tylenol or Motrin p r n  And she may take Robitussin or Mucinex p r n  Recommend increase fluids and rest   Follow-up next week or call sooner p r n  Or report emergency room for worsening symptoms including shortness of breath  Problem List Items Addressed This Visit    None     Visit Diagnoses     COVID-19 virus infection    -  Primary    Relevant Medications    nirmatrelvir & ritonavir (Paxlovid, 300/100,) tablet therapy pack               Reason for visit is   Chief Complaint   Patient presents with    Cold Like Symptoms     Patient  is positive for COVID   Home Covid test last night was positive  Headache , body aches , dizziness ,sore throat  , chills , lightheaded , Anorexia   Symptoms since on 09/13/2022        Encounter provider Анна Baeza DO    Provider located at [address] [place] [address] [phone]      Recent Visits  No visits were found meeting these conditions  Showing recent visits within past 7 days and meeting all other requirements  Today's Visits  Date Type Provider Dept   09/15/22 [address] [place] [address]   Showing today's visits and meeting all other requirements  Future Appointments  No visits were found meeting these conditions  Showing future appointments within next 150 days and meeting all other requirements       The patient was identified by name and date of birth   Josh Casarez was informed that this is a telemedicine visit and that the visit is being conducted through [address] Now and patient was informed that this is a secure, HIPAA-compliant platform  She agrees to proceed     My office door was closed  No one else was in the room  She acknowledged consent and understanding of privacy and security of the video platform  The patient has agreed to participate and understands they can discontinue the visit at any time  Patient is aware this is a billable service  Subjective  Candice Vitale is a 59 y o  female started 2 days ago on 09/13/2022 with headache yesterday developed body aches, fatigue and lightheadedness  Patient states she feels warm and chills but denies sweats  She admits to sore throat and nonproductive cough  She denies shortness of breath or wheezing although admits to slight chest tightness  Patient denies loss of sense of smell or taste, nausea, vomiting or diarrhea  She admits to decreased appetite although is tolerating fluids okay including Gatorade  Patient took home COVID test at 3:00 a m  Today on 09/15/2022 that was positive  Patient's  currently has a positive COVID infection and taking paxlovid with good results  Patient has been taking Tylenol with some improvement  Patient is fully vaccinated against NTLZL-48 receiving Pfizer on 03/21/2022 and 04/11/2020 to and 2 boosters on 10/16/2021 and the 2nd 1 in April of 2022  Patient has not had a prior COVID infection          HPI     Past Medical History:   Diagnosis Date    Colon polyp     Disease of thyroid gland     Endometriosis     Fibroids, subserous     Hypothyroidism        Past Surgical History:   Procedure Laterality Date    CHOLECYSTECTOMY      COLONOSCOPY  2015    per pt    HYSTERECTOMY  2008    right ovary removed also    OOPHORECTOMY Right 2008    TUBAL LIGATION         Current Outpatient Medications   Medication Sig Dispense Refill    Cholecalciferol (VITAMIN D3) 1000 units CAPS Take 2 capsules by mouth daily      conjugated estrogens (Premarin) vaginal cream Use a pea-size amount qhs x 2 weeks, then every other night 30 g 1    nirmatrelvir & ritonavir (Paxlovid, 300/100,) tablet therapy pack Take 3 tablets by mouth 2 (two) times a day for 5 days Take 2 nirmatrelvir tablets + 1 ritonavir tablet together per dose 30 tablet 0    phenazopyridine (PYRIDIUM) 200 mg tablet Take 1 tablet (200 mg total) by mouth every 8 (eight) hours as needed for bladder spasms 9 tablet 0    sulfamethoxazole-trimethoprim (BACTRIM DS) 800-160 mg per tablet Take 1 tablet by mouth every 12 (twelve) hours for 3 days 6 tablet 0    Synthroid 125 MCG tablet Take 1 tablet (125 mcg total) by mouth daily 90 tablet 3     No current facility-administered medications for this visit  Allergies   Allergen Reactions    Oxycodone Hives    Oxycodone-Acetaminophen Er        Review of Systems    Video Exam    Vitals:    09/15/22 1355   Temp: 98 1 °F (36 7 °C)   TempSrc: Tympanic   Weight: 93 4 kg (206 lb)   Height: 5' 7" (1 702 m)       Physical Exam  Constitutional:       General: She is not in acute distress  Appearance: Normal appearance  She is ill-appearing  She is not toxic-appearing or diaphoretic  Eyes:      General: No scleral icterus  Conjunctiva/sclera: Conjunctivae normal    Pulmonary:      Effort: Pulmonary effort is normal       Comments: Patient is talking in complete sentences without shortness of breath or cough  Neurological:      Mental Status: She is alert and oriented to person, place, and time  Psychiatric:         Mood and Affect: Mood normal          Behavior: Behavior normal          Thought Content:  Thought content normal          Judgment: Judgment normal           I spent 20 minutes directly with the patient during this visit

## 2022-09-16 ENCOUNTER — TELEPHONE (OUTPATIENT)
Dept: FAMILY MEDICINE CLINIC | Facility: CLINIC | Age: 57
End: 2022-09-16

## 2022-09-16 DIAGNOSIS — J30.2 SEASONAL ALLERGIC RHINITIS, UNSPECIFIED TRIGGER: Primary | ICD-10-CM

## 2022-09-16 LAB — BACTERIA UR CULT: ABNORMAL

## 2022-09-16 RX ORDER — FLUTICASONE PROPIONATE 50 MCG
2 SPRAY, SUSPENSION (ML) NASAL DAILY
Qty: 16 G | Refills: 2 | Status: SHIPPED | OUTPATIENT
Start: 2022-09-16 | End: 2022-10-09

## 2022-09-16 NOTE — TELEPHONE ENCOUNTER
Please advise   Patient called and asked for a script for flunose sent to the [facility] in [facility]

## 2022-09-29 ENCOUNTER — TELEMEDICINE (OUTPATIENT)
Dept: FAMILY MEDICINE CLINIC | Facility: CLINIC | Age: 57
End: 2022-09-29
Payer: COMMERCIAL

## 2022-09-29 DIAGNOSIS — U07.1 COVID-19 VIRUS INFECTION: Primary | ICD-10-CM

## 2022-09-29 PROCEDURE — 99213 OFFICE O/P EST LOW 20 MIN: CPT | Performed by: FAMILY MEDICINE

## 2022-09-29 RX ORDER — COVID-19 ANTIGEN TEST
KIT MISCELLANEOUS
COMMUNITY
Start: 2022-09-14

## 2022-09-29 NOTE — PROGRESS NOTES
Virtual Regular Visit    Verification of patient location:    Patient is located in the following state in which I hold an active license [place]      Assessment/Plan:  Recommend symptomatic treatment with Flonase, Claritin and Robitussin or Mucinex p r n  Recommend increase fluids and rest   Patient to continue vitamin-C and vitamin-D  Patient should home isolate for 5 days and then wear a mask for additional 5 days  Follow-up next week or call sooner p r n     Problem List Items Addressed This Visit    None     Visit Diagnoses     COVID-19 virus infection    -  Primary               Reason for visit is   Chief Complaint   Patient presents with    COVID-19     This morning     Cough    Nasal Congestion    Sore Throat    Tired        Encounter provider Roseann Lemus DO    Provider located at [address] [facility] [phone]      Recent Visits  No visits were found meeting these conditions  Showing recent visits within past 7 days and meeting all other requirements  Today's Visits  Date Type Provider Dept   09/29/22 [address] , DO [facility]   Showing today's visits and meeting all other requirements  Future Appointments  No visits were found meeting these conditions  Showing future appointments within next 150 days and meeting all other requirements       The patient was identified by name and date of birth  Stu Ta was informed that this is a telemedicine visit and that the visit is being conducted through [address] [place] [address] Now and patient was informed that this is a secure, HIPAA-compliant platform  She agrees to proceed     My office door was closed  No one else was in the room  She acknowledged consent and understanding of privacy and security of the video platform  The patient has agreed to participate and understands they can discontinue the visit at any time  Patient is aware this is a billable service  Subjective  Susan mcdaniel a 59 y o  female had positive COVID infection on 09/15/2022 was treated with Paxlovid and was feeling better until yesterday complains of sneezing, nasal congestion productive of clear mucus slight dry nonproductive cough and mild sore throat and persistent hoarseness  She denies shortness of breath, chest tightness or wheezing  She admits to fatigue but denies body aches or headache  She denies fever, chills or sweats and temperature is 98 1°  She denies loss of sense of smell or taste, nausea, vomiting or diarrhea  Patient states she feels like she has a mild cold  HPI     Past Medical History:   Diagnosis Date    Colon polyp     Disease of thyroid gland     Endometriosis     Fibroids, subserous     Hypothyroidism        Past Surgical History:   Procedure Laterality Date    CHOLECYSTECTOMY      COLONOSCOPY  2015    per pt    HYSTERECTOMY  2008    right ovary removed also    OOPHORECTOMY Right 2008    TUBAL LIGATION         Current Outpatient Medications   Medication Sig Dispense Refill    Cholecalciferol (VITAMIN D3) 1000 units CAPS Take 2 capsules by mouth daily      Flowflex COVID-19 Ag Home Test KIT Use as directed  As needed      fluticasone (FLONASE) 50 mcg/act nasal spray 2 sprays into each nostril daily 16 g 2    phenazopyridine (PYRIDIUM) 200 mg tablet Take 1 tablet (200 mg total) by mouth every 8 (eight) hours as needed for bladder spasms 9 tablet 0    Synthroid 125 MCG tablet Take 1 tablet (125 mcg total) by mouth daily 90 tablet 3    conjugated estrogens (Premarin) vaginal cream Use a pea-size amount qhs x 2 weeks, then every other night (Patient not taking: Reported on 9/29/2022) 30 g 1     No current facility-administered medications for this visit  Allergies   Allergen Reactions    Oxycodone Hives    Oxycodone-Acetaminophen Er        Review of Systems    Video Exam    There were no vitals filed for this visit      Physical Exam  Constitutional:       General: She is not in acute distress  Appearance: Normal appearance  She is not ill-appearing, toxic-appearing or diaphoretic  Eyes:      General: No scleral icterus  Conjunctiva/sclera: Conjunctivae normal    Pulmonary:      Effort: Pulmonary effort is normal       Comments: Patient is talking in complete sentences without shortness of breath or cough  Neurological:      Mental Status: She is alert and oriented to person, place, and time  Psychiatric:         Mood and Affect: Mood normal          Behavior: Behavior normal          Thought Content:  Thought content normal          Judgment: Judgment normal           I spent 10 minutes directly with the patient during this visit

## 2022-10-04 ENCOUNTER — TELEPHONE (OUTPATIENT)
Dept: FAMILY MEDICINE CLINIC | Facility: CLINIC | Age: 57
End: 2022-10-04

## 2022-10-04 NOTE — TELEPHONE ENCOUNTER
Pt called and stated she had covid 3 weeks ago but she would like to know when can her  and herself can get the 3 booster vaccine

## 2022-10-09 DIAGNOSIS — J30.2 SEASONAL ALLERGIC RHINITIS, UNSPECIFIED TRIGGER: ICD-10-CM

## 2022-10-09 RX ORDER — FLUTICASONE PROPIONATE 50 MCG
SPRAY, SUSPENSION (ML) NASAL
Qty: 48 ML | Refills: 1 | Status: SHIPPED | OUTPATIENT
Start: 2022-10-09

## 2022-10-10 ENCOUNTER — IOP CHECK (OUTPATIENT)
Dept: URBAN - METROPOLITAN AREA CLINIC 6 | Facility: CLINIC | Age: 57
End: 2022-10-10

## 2022-10-10 DIAGNOSIS — H40.243: ICD-10-CM

## 2022-10-10 DIAGNOSIS — H40.033: ICD-10-CM

## 2022-10-10 PROCEDURE — 92202 OPSCPY EXTND ON/MAC DRAW: CPT

## 2022-10-10 PROCEDURE — 92020 GONIOSCOPY: CPT

## 2022-10-10 PROCEDURE — 92014 COMPRE OPH EXAM EST PT 1/>: CPT

## 2022-10-10 PROCEDURE — 92133 CPTRZD OPH DX IMG PST SGM ON: CPT

## 2022-10-10 ASSESSMENT — TONOMETRY
OS_IOP_MMHG: 9
OS_IOP_MMHG: 19 POST DILATION
OD_IOP_MMHG: 9

## 2022-10-10 ASSESSMENT — VISUAL ACUITY
OS_SC: 20/30-1
OD_SC: 20/30-1

## 2022-12-06 ENCOUNTER — APPOINTMENT (EMERGENCY)
Dept: VASCULAR ULTRASOUND | Facility: HOSPITAL | Age: 57
End: 2022-12-06

## 2022-12-06 ENCOUNTER — HOSPITAL ENCOUNTER (EMERGENCY)
Facility: HOSPITAL | Age: 57
Discharge: HOME | End: 2022-12-06
Attending: EMERGENCY MEDICINE

## 2022-12-06 VITALS
TEMPERATURE: 97.5 F | OXYGEN SATURATION: 98 % | RESPIRATION RATE: 16 BRPM | HEART RATE: 71 BPM | SYSTOLIC BLOOD PRESSURE: 114 MMHG | DIASTOLIC BLOOD PRESSURE: 66 MMHG

## 2022-12-06 DIAGNOSIS — M71.20 BAKER'S CYST: ICD-10-CM

## 2022-12-06 DIAGNOSIS — M25.562 LEFT KNEE PAIN: Primary | ICD-10-CM

## 2022-12-06 NOTE — ED PROVIDER NOTES
History  Chief Complaint   Patient presents with   • Knee Pain     Pt presents to the ED with c/o left knee pain that radiates into calf  Pt reports varicose veins that now has a "lump" in the back of her leg, painful to touch  68-year-old female with PMH as listed presents with left knee pain for the past few weeks  Patient reports pain behind her left knee  No swelling  No chest pain or shortness of breath  No nausea or vomiting  No fever or chills  History provided by:  Patient  Knee Pain  Associated symptoms: no back pain, no decreased ROM, no fatigue, no fever, no neck pain, no swelling and no tingling        Prior to Admission Medications   Prescriptions Last Dose Informant Patient Reported? Taking?    Cholecalciferol (VITAMIN D3) 1000 units CAPS   Yes No   Sig: Take 2 capsules by mouth daily   Flowflex COVID-19 Ag Home Test KIT   Yes No   Sig: Use as directed  As needed   Synthroid 125 MCG tablet   No No   Sig: Take 1 tablet (125 mcg total) by mouth daily   conjugated estrogens (Premarin) vaginal cream   No No   Sig: Use a pea-size amount qhs x 2 weeks, then every other night   Patient not taking: Reported on 9/29/2022   fluticasone (FLONASE) 50 mcg/act nasal spray   No No   Sig: SPRAY 2 SPRAYS INTO EACH NOSTRIL EVERY DAY   phenazopyridine (PYRIDIUM) 200 mg tablet   No No   Sig: Take 1 tablet (200 mg total) by mouth every 8 (eight) hours as needed for bladder spasms      Facility-Administered Medications: None       Past Medical History:   Diagnosis Date   • Colon polyp    • Disease of thyroid gland    • Endometriosis    • Fibroids, subserous    • Hypothyroidism        Past Surgical History:   Procedure Laterality Date   • CHOLECYSTECTOMY     • COLONOSCOPY  2015    per pt   • HYSTERECTOMY  2008    right ovary removed also   • OOPHORECTOMY Right 2008   • TUBAL LIGATION         Family History   Problem Relation Age of Onset   • No Known Problems Mother    • Hypertension Father    • Kidney disease Father    • Heart disease Sister         CARDIAC DISORDER   • Diabetes Sister         MELLITUS   • Hypothyroidism Sister         HYPOTHYROIDISM, GOITROUS   • No Known Problems Daughter    • No Known Problems Maternal Grandmother    • No Known Problems Maternal Grandfather    • No Known Problems Paternal Grandmother    • No Known Problems Paternal Grandfather    • No Known Problems Sister    • No Known Problems Maternal Aunt    • No Known Problems Maternal Aunt    • No Known Problems Maternal Aunt    • No Known Problems Maternal Aunt    • No Known Problems Maternal Aunt      I have reviewed and agree with the history as documented  E-Cigarette/Vaping   • E-Cigarette Use Never User      E-Cigarette/Vaping Substances   • Nicotine No    • THC No    • CBD No    • Flavoring No    • Other No    • Unknown No      Social History     Tobacco Use   • Smoking status: Former   • Smokeless tobacco: Never   • Tobacco comments:     quit 2004   Vaping Use   • Vaping Use: Never used   Substance Use Topics   • Alcohol use: Yes     Comment: SOCIAL ALCOHOL USE AS PER [employer]   • Drug use: No       Review of Systems   Constitutional: Negative for activity change, fatigue and fever  HENT: Negative for congestion, drooling, ear discharge, ear pain, facial swelling, nosebleeds, rhinorrhea, sinus pain, sneezing and tinnitus  Eyes: Negative for pain, discharge, redness and itching  Respiratory: Negative for apnea, cough, choking, shortness of breath, wheezing and stridor  Cardiovascular: Negative for chest pain and palpitations  Gastrointestinal: Negative for abdominal distention and abdominal pain  Endocrine: Negative for polydipsia, polyphagia and polyuria  Genitourinary: Negative for difficulty urinating, dyspareunia and hematuria  Musculoskeletal: Negative for back pain and neck pain  Allergic/Immunologic: Negative for environmental allergies and food allergies     Neurological: Negative for dizziness, seizures, facial asymmetry, light-headedness, numbness and headaches  Hematological: Negative for adenopathy  Does not bruise/bleed easily  Psychiatric/Behavioral: Negative for agitation, confusion and hallucinations  The patient is not nervous/anxious  Physical Exam  Physical Exam  HENT:      Head: Normocephalic  Nose: Nose normal       Mouth/Throat:      Mouth: Mucous membranes are moist    Eyes:      Extraocular Movements: Extraocular movements intact  Conjunctiva/sclera: Conjunctivae normal       Pupils: Pupils are equal, round, and reactive to light  Cardiovascular:      Rate and Rhythm: Normal rate  Pulses: Normal pulses  Pulmonary:      Effort: Pulmonary effort is normal    Abdominal:      Palpations: Abdomen is soft  Musculoskeletal:         General: Normal range of motion  Cervical back: Normal range of motion and neck supple  Comments: No swelling of the left extremity  Slight tenderness to palpation over left popliteal area  Distal pulses intact  Skin:     General: Skin is warm  Capillary Refill: Capillary refill takes less than 2 seconds  Neurological:      General: No focal deficit present  Mental Status: She is alert     Psychiatric:         Mood and Affect: Mood normal          Vital Signs  ED Triage Vitals   Temperature Pulse Respirations Blood Pressure SpO2   12/06/22 1309 12/06/22 1309 12/06/22 1309 12/06/22 1309 12/06/22 1309   97 5 °F (36 4 °C) 94 16 122/77 99 %      Temp Source Heart Rate Source Patient Position - Orthostatic VS BP Location FiO2 (%)   12/06/22 1309 12/06/22 1309 12/06/22 1631 12/06/22 1631 --   Oral Monitor Sitting Left arm       Pain Score       12/06/22 1309       8           Vitals:    12/06/22 1309 12/06/22 1631   BP: 122/77 114/66   Pulse: 94 71   Patient Position - Orthostatic VS:  Sitting         Visual Acuity      ED Medications  Medications - No data to display    Diagnostic Studies  Results Reviewed     None VAS lower limb venous duplex study, unilateral/limited    (Results Pending)              Procedures  Procedures         ED Course       Patient has Baker's cyst on the left popliteal area on ultrasound  Patient was also advised that she likely will need MSK ultrasound of the left extremity to make sure there is no underlying nerve sheath tumor because I could feel a palpable area of nodularity in the left popliteal area  Patient is aware that she will either need an ultrasound of the musculoskeletal area and or MRI to make sure there is no underlying lesion  Patient verbalized understanding and agreed to follow-up with primary care and orthopedics within 1 week  Extensive return precautions provided  Full DC instructions discussed and patient knows when to seek immediate medical attention  Patient has proper follow-up  All the results discussed with the patient and patient knows that they may require further workup  Limitation of the ED workup discussed  All questions and concerns from patient or family addressed  Understanding of the instructions verbalized  MDM  Number of Diagnoses or Management Options  Baker's cyst: new and requires workup  Left knee pain: new and requires workup  Risk of Complications, Morbidity, and/or Mortality  Presenting problems: moderate        Disposition  Final diagnoses:   Left knee pain   Baker's cyst     Time reflects when diagnosis was documented in both MDM as applicable and the Disposition within this note     Time User Action Codes Description Comment    12/6/2022  4:57 PM Taras Sheldon Add [M25 562] Left knee pain     12/6/2022  4:57 PM Taras Sheldon Add [M71 20] Baker's cyst       ED Disposition     ED Disposition   Discharge    Condition   Stable    Date/Time   Tue Dec 6, 2022  4:57 PM    Comment   Malika Montez discharge to home/self care                 Follow-up Information     Follow up With Specialties Details Why Contact Info Additional [phone] [facility] Schedule an appointment as soon as possible for a visit in 1 day  [address], [address] [facility], [address] [place] [address], [phone]          Patient's Medications   Discharge Prescriptions    No medications on file       No discharge procedures on file      PDMP Review     None          ED Provider  Electronically Signed by           Steffi Byrd DO  12/06/22 9992

## 2022-12-07 ENCOUNTER — TELEMEDICINE (OUTPATIENT)
Dept: FAMILY MEDICINE CLINIC | Facility: CLINIC | Age: 57
End: 2022-12-07

## 2022-12-07 DIAGNOSIS — M71.22 BAKER'S CYST OF KNEE, LEFT: Primary | ICD-10-CM

## 2022-12-07 NOTE — ASSESSMENT & PLAN NOTE
- Venous duplex showed a well defined hypoechoic non-vascularized cystic-type structure noted  in the popliteal fossa/posterior distal thigh consistent with a Baker's cyst although there is concern for a possible tumor    - At this time will order an MRI of the left knee for further evaluation  - Follow up with Orthopedic surgery as scheduled

## 2022-12-07 NOTE — PROGRESS NOTES
Virtual Regular Visit    Verification of patient location:    Patient is located in the following state in which I hold an active license [place]      Assessment/Plan:    Problem List Items Addressed This Visit        Musculoskeletal and Integument    Baker's cyst of knee, left - Primary     - Venous duplex showed a well defined hypoechoic non-vascularized cystic-type structure noted  in the popliteal fossa/posterior distal thigh consistent with a Baker's cyst although there is concern for a possible tumor    - At this time will order an MRI of the left knee for further evaluation  - Follow up with Orthopedic surgery as scheduled  Relevant Orders    MRI knee left  wo contrast            Reason for visit is No chief complaint on file  Encounter provider Flavio Harris MD    Provider located at [facility] [address] [id number]      Recent Visits  No visits were found meeting these conditions  Showing recent visits within past 7 days and meeting all other requirements  Today's Visits  Date Type Provider Dept   12/07/22 Telemedicine Flavio Harris MD [address] today's visits and meeting all other requirements  Future Appointments  No visits were found meeting these conditions  Showing future appointments within next 150 days and meeting all other requirements       The patient was identified by name and date of birth  Mayito Turner was informed that this is a telemedicine visit and that the visit is being conducted through the [employer]  She agrees to proceed     My office door was closed  No one else was in the room  She acknowledged consent and understanding of privacy and security of the video platform  The patient has agreed to participate and understands they can discontinue the visit at any time  Patient is aware this is a billable service       Subjective  HPI     Mayito Turner is a 59 y o  female who presents today for a follow up after being seen in the Emergency department yesterday for left knee pain  Patient has been experiencing progressively worsening posterior left knee pain for the past couple of weeks and ultimately had an ultrasound of the left knee which confirmed the presence of a baker's cyst  There was also concern for a possible tumor and it was recommended that patient make an appointment with Orthopedic surgery and have an MRI of the knee  Patient is scheduled to see Orthopedic surgery 12/15  Past Medical History:   Diagnosis Date   • Colon polyp    • Disease of thyroid gland    • Endometriosis    • Fibroids, subserous    • Hypothyroidism        Past Surgical History:   Procedure Laterality Date   • CHOLECYSTECTOMY     • COLONOSCOPY  2015    per pt   • HYSTERECTOMY  2008    right ovary removed also   • OOPHORECTOMY Right 2008   • TUBAL LIGATION         Current Outpatient Medications   Medication Sig Dispense Refill   • Cholecalciferol (VITAMIN D3) 1000 units CAPS Take 2 capsules by mouth daily     • conjugated estrogens (Premarin) vaginal cream Use a pea-size amount qhs x 2 weeks, then every other night (Patient not taking: Reported on 9/29/2022) 30 g 1   • Flowflex COVID-19 Ag Home Test KIT Use as directed  As needed     • fluticasone (FLONASE) 50 mcg/act nasal spray SPRAY 2 SPRAYS INTO EACH NOSTRIL EVERY DAY 48 mL 1   • phenazopyridine (PYRIDIUM) 200 mg tablet Take 1 tablet (200 mg total) by mouth every 8 (eight) hours as needed for bladder spasms 9 tablet 0   • Synthroid 125 MCG tablet Take 1 tablet (125 mcg total) by mouth daily 90 tablet 3     No current facility-administered medications for this visit  Allergies   Allergen Reactions   • Oxycodone Hives   • Oxycodone-Acetaminophen Er        Review of Systems   Constitutional: Negative  HENT: Negative  Eyes: Negative  Respiratory: Negative  Cardiovascular: Negative  Gastrointestinal: Negative      Genitourinary: Negative  Musculoskeletal:        Left knee pain   Skin: Negative  Neurological: Negative  Psychiatric/Behavioral: Negative  Video Exam    There were no vitals filed for this visit  Physical Exam  Constitutional:       General: She is not in acute distress  Appearance: She is not ill-appearing  HENT:      Head: Normocephalic and atraumatic  Eyes:      General:         Right eye: No discharge  Left eye: No discharge  Extraocular Movements: Extraocular movements intact  Pulmonary:      Effort: No respiratory distress  Neurological:      General: No focal deficit present  Mental Status: She is alert     Psychiatric:         Mood and Affect: Mood normal          Behavior: Behavior normal           I spent 20 minutes directly with the patient during this visit

## 2022-12-13 ENCOUNTER — HOSPITAL ENCOUNTER (OUTPATIENT)
Dept: RADIOLOGY | Facility: IMAGING CENTER | Age: 57
Discharge: HOME | End: 2022-12-13

## 2022-12-13 DIAGNOSIS — M71.22 BAKER'S CYST OF KNEE, LEFT: ICD-10-CM

## 2022-12-15 NOTE — RESULT ENCOUNTER NOTE
Received a call from the patient  She already had an appointment with ortho today and has another appointment next week

## 2023-01-06 ENCOUNTER — TELEPHONE (OUTPATIENT)
Dept: FAMILY MEDICINE CLINIC | Facility: CLINIC | Age: 58
End: 2023-01-06

## 2023-01-06 NOTE — TELEPHONE ENCOUNTER
Patient called regarding a Tattoo/micro-blading brow session  Artist is stating she needs to have the letter stating that she is able to have it done due to her hypothyroidism  She was asking if a letter can be written in her [facility]

## 2023-01-27 DIAGNOSIS — E03.9 HYPOTHYROIDISM, UNSPECIFIED TYPE: ICD-10-CM

## 2023-01-27 RX ORDER — LEVOTHYROXINE SODIUM 125 MCG
125 TABLET ORAL DAILY
Qty: 90 TABLET | Refills: 0 | Status: SHIPPED | OUTPATIENT
Start: 2023-01-27

## 2023-01-27 NOTE — TELEPHONE ENCOUNTER
Protocol Fail   Sig: Take 1 tablet (125 mcg total) by mouth daily    Disp:  90 tablet    Refills:  3    CONCHA     Start: 1/27/2023    Class: Normal    Non-formulary For: Hypothyroidism, unspecified type    Last ordered: 1 year ago by Balbir Campos DO     Endocrinology:  Hypothyroid Agents Failed 01/27/2023 09:38 AM   Protocol Details  TSH within 360 days    Valid encounter within last 12 months      To be filled at: [facility]/pharmacy #[id number]- [address]

## 2023-02-07 ENCOUNTER — TELEMEDICINE (OUTPATIENT)
Dept: FAMILY MEDICINE CLINIC | Facility: CLINIC | Age: 58
End: 2023-02-07

## 2023-02-07 DIAGNOSIS — J01.00 ACUTE NON-RECURRENT MAXILLARY SINUSITIS: Primary | ICD-10-CM

## 2023-02-07 RX ORDER — AMOXICILLIN AND CLAVULANATE POTASSIUM 875; 125 MG/1; MG/1
1 TABLET, FILM COATED ORAL EVERY 12 HOURS SCHEDULED
Qty: 14 TABLET | Refills: 0 | Status: SHIPPED | OUTPATIENT
Start: 2023-02-07 | End: 2023-02-14

## 2023-02-07 RX ORDER — IPRATROPIUM BROMIDE 21 UG/1
2 SPRAY, METERED NASAL EVERY 12 HOURS
Qty: 30 ML | Refills: 0 | Status: SHIPPED | OUTPATIENT
Start: 2023-02-07

## 2023-02-07 NOTE — PROGRESS NOTES
Virtual Regular Visit    Verification of patient location:    Patient is located in the following state in which I hold an active license [place]      Assessment/Plan: Recommend holding Bactrim  Patient will call with any new persisting or worsening symptoms  Problem List Items Addressed This Visit    None  Visit Diagnoses     Acute non-recurrent maxillary sinusitis    -  Primary    Relevant Medications    amoxicillin-clavulanate (AUGMENTIN) 875-125 mg per tablet    ipratropium (ATROVENT) 0 03 % nasal spray               Reason for visit is   Chief Complaint   Patient presents with   • Virtual Regular Visit        Encounter provider Adams Guerrero DO    Provider located at [address] [place] [address] [address] [phone]      Recent Visits  No visits were found meeting these conditions  Showing recent visits within past 7 days and meeting all other requirements  Today's Visits  Date Type Provider Dept   02/07/23 Telemedicine Adams Guerrero DO [facility]   Showing today's visits and meeting all other requirements  Future Appointments  No visits were found meeting these conditions  Showing future appointments within next 150 days and meeting all other requirements       The patient was identified by name and date of birth  Lydia Bueno was informed that this is a telemedicine visit and that the visit is being conducted through the [address] [place] [address] [employer] platform  She agrees to proceed     My office door was closed  No one else was in the room  She acknowledged consent and understanding of privacy and security of the video platform  The patient has agreed to participate and understands they can discontinue the visit at any time  Patient is aware this is a billable service  Subjective  Lydia Bueno is a 59 y o  female for sinus pressure  Patient was put on Bactrim for sinus infection last week but is still having sinus pressure and congestion    Patient denies any fever sweats or chills  COVID testing apparently was negative  Denies any GI complaints  Past Medical History:   Diagnosis Date   • Colon polyp    • Disease of thyroid gland    • Endometriosis    • Fibroids, subserous    • Hypothyroidism        Past Surgical History:   Procedure Laterality Date   • CHOLECYSTECTOMY     • COLONOSCOPY  2015    per pt   • HYSTERECTOMY  2008    right ovary removed also   • OOPHORECTOMY Right 2008   • TUBAL LIGATION         Current Outpatient Medications   Medication Sig Dispense Refill   • amoxicillin-clavulanate (AUGMENTIN) 875-125 mg per tablet Take 1 tablet by mouth every 12 (twelve) hours for 7 days 14 tablet 0   • ipratropium (ATROVENT) 0 03 % nasal spray 2 sprays into each nostril every 12 (twelve) hours 30 mL 0   • Cholecalciferol (VITAMIN D3) 1000 units CAPS Take 2 capsules by mouth daily     • conjugated estrogens (Premarin) vaginal cream Use a pea-size amount qhs x 2 weeks, then every other night (Patient not taking: Reported on 9/29/2022) 30 g 1   • Flowflex COVID-19 Ag Home Test KIT Use as directed  As needed     • fluticasone (FLONASE) 50 mcg/act nasal spray SPRAY 2 SPRAYS INTO EACH NOSTRIL EVERY DAY 48 mL 1   • phenazopyridine (PYRIDIUM) 200 mg tablet Take 1 tablet (200 mg total) by mouth every 8 (eight) hours as needed for bladder spasms 9 tablet 0   • Synthroid 125 MCG tablet Take 1 tablet (125 mcg total) by mouth daily 90 tablet 0     No current facility-administered medications for this visit  Allergies   Allergen Reactions   • Oxycodone Hives   • Oxycodone-Acetaminophen Er        Review of Systems   Constitutional: Negative  HENT: Positive for congestion and sinus pressure  Eyes: Negative  Respiratory: Negative  Cardiovascular: Negative  Gastrointestinal: Negative  Endocrine: Negative  Genitourinary: Negative  Musculoskeletal: Negative  Skin: Negative  Allergic/Immunologic: Negative  Neurological: Negative  Hematological: Negative  Psychiatric/Behavioral: Negative  Video Exam    There were no vitals filed for this visit  Physical Exam  Vitals reviewed  Constitutional:       Appearance: She is well-developed  HENT:      Head: Normocephalic and atraumatic  Right Ear: External ear normal  Tympanic membrane is not erythematous or bulging  Left Ear: External ear normal  Tympanic membrane is not erythematous or bulging  Nose: Nose normal       Mouth/Throat:      Mouth: No oral lesions  Pharynx: No oropharyngeal exudate  Eyes:      General: No scleral icterus  Right eye: No discharge  Left eye: No discharge  Conjunctiva/sclera: Conjunctivae normal    Neck:      Thyroid: No thyromegaly  Cardiovascular:      Rate and Rhythm: Normal rate and regular rhythm  Heart sounds: Normal heart sounds  No murmur heard  No friction rub  No gallop  Pulmonary:      Effort: Pulmonary effort is normal  No respiratory distress  Breath sounds: No wheezing or rales  Chest:      Chest wall: No tenderness  Abdominal:      General: Bowel sounds are normal  There is no distension  Palpations: Abdomen is soft  There is no mass  Tenderness: There is no abdominal tenderness  There is no guarding or rebound  Musculoskeletal:         General: No tenderness or deformity  Normal range of motion  Cervical back: Normal range of motion and neck supple  Lymphadenopathy:      Cervical: No cervical adenopathy  Skin:     General: Skin is warm and dry  Coloration: Skin is not pale  Findings: No erythema or rash  Neurological:      Mental Status: She is alert and oriented to person, place, and time  Cranial Nerves: No cranial nerve deficit  Motor: No abnormal muscle tone  Coordination: Coordination normal       Deep Tendon Reflexes: Reflexes are normal and symmetric     Psychiatric:         Behavior: Behavior normal           I spent 15 minutes directly with the patient during this visit

## 2023-02-09 ENCOUNTER — TELEPHONE (OUTPATIENT)
Dept: FAMILY MEDICINE CLINIC | Facility: CLINIC | Age: 58
End: 2023-02-09

## 2023-02-09 NOTE — TELEPHONE ENCOUNTER
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

## 2023-02-10 ENCOUNTER — PROBLEM (OUTPATIENT)
Dept: URBAN - METROPOLITAN AREA CLINIC 6 | Facility: CLINIC | Age: 58
End: 2023-02-10

## 2023-02-10 DIAGNOSIS — H40.033: ICD-10-CM

## 2023-02-10 DIAGNOSIS — H40.243: ICD-10-CM

## 2023-02-10 PROCEDURE — 92012 INTRM OPH EXAM EST PATIENT: CPT

## 2023-02-10 ASSESSMENT — TONOMETRY
OS_IOP_MMHG: 9
OD_IOP_MMHG: 9
OS_IOP_MMHG: 7
OD_IOP_MMHG: 8

## 2023-02-10 ASSESSMENT — VISUAL ACUITY
OD_CC: 20/40
OS_CC: 20/30

## 2023-02-17 ENCOUNTER — TELEPHONE (OUTPATIENT)
Dept: FAMILY MEDICINE CLINIC | Facility: CLINIC | Age: 58
End: 2023-02-17

## 2023-02-17 NOTE — TELEPHONE ENCOUNTER
Received a call from the patient stating she is having post nasal drip, sore throat, hoarse voice  She stopped her nasal spray due to glaucoma  She was asking for an appointment today with Dr Perez Henson, but he is not in the office  Informed her that Dr Abran Asher is booked and recommended she go to a [facility] so she can get the help she needs

## 2023-06-01 ENCOUNTER — IOP CHECK (OUTPATIENT)
Dept: URBAN - METROPOLITAN AREA CLINIC 6 | Facility: CLINIC | Age: 58
End: 2023-06-01

## 2023-06-01 DIAGNOSIS — H40.033: ICD-10-CM

## 2023-06-01 DIAGNOSIS — H40.243: ICD-10-CM

## 2023-06-01 PROCEDURE — 92012 INTRM OPH EXAM EST PATIENT: CPT

## 2023-06-01 PROCEDURE — 92083 EXTENDED VISUAL FIELD XM: CPT

## 2023-06-01 ASSESSMENT — TONOMETRY
OD_IOP_MMHG: 8
OS_IOP_MMHG: 7

## 2023-06-01 ASSESSMENT — VISUAL ACUITY
OS_SC: 20/40+2
OD_PH: 20/40
OD_SC: 20/60

## 2023-06-26 ENCOUNTER — RA CDI HCC (OUTPATIENT)
Dept: OTHER | Facility: HOSPITAL | Age: 58
End: 2023-06-26

## 2023-06-26 NOTE — PROGRESS NOTES
[facility]  coding opportunities          Chart Reviewed number of suggestions sent to Provider: 1   [phone]      Patients Insurance        Commercial Insurance: Yuri Hong

## 2023-07-11 ENCOUNTER — ANNUAL EXAM (OUTPATIENT)
Dept: OBGYN CLINIC | Facility: CLINIC | Age: 58
End: 2023-07-11
Payer: COMMERCIAL

## 2023-07-11 VITALS
DIASTOLIC BLOOD PRESSURE: 70 MMHG | BODY MASS INDEX: 32.02 KG/M2 | SYSTOLIC BLOOD PRESSURE: 120 MMHG | WEIGHT: 204 LBS | HEIGHT: 67 IN

## 2023-07-11 DIAGNOSIS — Z12.31 ENCOUNTER FOR SCREENING MAMMOGRAM FOR MALIGNANT NEOPLASM OF BREAST: ICD-10-CM

## 2023-07-11 DIAGNOSIS — Z01.419 WOMEN'S ANNUAL ROUTINE GYNECOLOGICAL EXAMINATION: Primary | ICD-10-CM

## 2023-07-11 PROBLEM — N95.1 SYMPTOMATIC MENOPAUSAL OR FEMALE CLIMACTERIC STATES: Status: RESOLVED | Noted: 2019-09-18 | Resolved: 2023-07-11

## 2023-07-11 PROCEDURE — S0612 ANNUAL GYNECOLOGICAL EXAMINA: HCPCS | Performed by: OBSTETRICS & GYNECOLOGY

## 2023-07-11 NOTE — PROGRESS NOTES
ASSESSMENT & PLAN:   Diagnoses and all orders for this visit:    Women's annual routine gynecological examination    Encounter for screening mammogram for malignant neoplasm of breast  -     Mammo screening bilateral w 3d & cad; Future          The following were reviewed in today's visit: ASCCP guidelines, Gardisil vaccination, STD testing breast self exam, mammography screening ordered, use and side effects of HRT, menopause, exercise and healthy diet. Patient to return to office in yearly for annual exam.     All questions have been answered to her satisfaction. CC:  Annual Gynecologic Examination  Chief Complaint   Patient presents with   • Gynecologic Exam     Pt is here for her annual exam. She is doing well, no concerns. HYST 2008- no more paps  Pap 10/17/2018 neg/neg HPV  Mammo 4/15/2022  Colonoscopy 2021        HPI: Nikki García is a 59 y.o. Tonya Fine who presents for annual gynecologic examination. She has the following concerns:  none      Health Maintenance:    Exercise: frequently  Breast exams/breast awareness: yes  Diet: well balanced diet  Last mammogram:   Colorectal cancer screenin      Past Medical History:   Diagnosis Date   • Colon polyp    • Disease of thyroid gland    • Endometriosis    • Fibroids, subserous    • Hypothyroidism        Past Surgical History:   Procedure Laterality Date   • CHOLECYSTECTOMY     • COLONOSCOPY      per pt   • HYSTERECTOMY      right ovary removed also   • OOPHORECTOMY Right    • TUBAL LIGATION         Past OB/Gyn History:   No LMP recorded (lmp unknown). Patient has had a hysterectomy. Menopausal status: postmenopausal  Menopausal symptoms: mild vulvar dryness controlled with coconut oil    Last Pap: 2018 : no abnormalities  History of abnormal Pap smear: no    Patient is currently sexually active.    STD testing: no  Current contraception: none      Family History  Family History   Problem Relation Age of Onset   • No Known Problems Mother    • Hypertension Father    • Kidney disease Father    • Heart disease Sister         CARDIAC DISORDER   • Diabetes Sister         MELLITUS   • Hypothyroidism Sister         HYPOTHYROIDISM, GOITROUS   • No Known Problems Daughter    • No Known Problems Maternal Grandmother    • No Known Problems Maternal Grandfather    • No Known Problems Paternal Grandmother    • No Known Problems Paternal Grandfather    • No Known Problems Sister    • No Known Problems Maternal Aunt    • No Known Problems Maternal Aunt    • No Known Problems Maternal Aunt    • No Known Problems Maternal Aunt    • No Known Problems Maternal Aunt        Family history of uterine or ovarian cancer: no  Family history of breast cancer: no  Family history of colon cancer: no    Social History:  Social History     Socioeconomic History   • Marital status: /Civil Union     Spouse name: Not on file   • Number of children: 1   • Years of education: Not on file   • Highest education level: Not on file   Occupational History   • Not on file   Tobacco Use   • Smoking status: Former   • Smokeless tobacco: Never   • Tobacco comments:     quit 2004   Vaping Use   • Vaping Use: Never used   Substance and Sexual Activity   • Alcohol use: Yes     Comment: social   • Drug use: No   • Sexual activity: Yes     Partners: Male     Birth control/protection: Post-menopausal   Other Topics Concern   • Not on file   Social History Narrative    ALWAYS USES SEAT BELT    CAFFEINE USE     Social Determinants of Health     Financial Resource Strain: Not on file   Food Insecurity: Not on file   Transportation Needs: Not on file   Physical Activity: Not on file   Stress: Not on file   Social Connections: Not on file   Intimate Partner Violence: Not on file   Housing Stability: Not on file     Domestic violence screen: negative    Allergies:   Allergies   Allergen Reactions   • Oxycodone Hives   • Oxycodone-Acetaminophen Er        Medications:    Current Outpatient Medications:   •  Cholecalciferol (VITAMIN D3) 1000 units CAPS, Take 2 capsules by mouth daily, Disp: , Rfl:   •  phenazopyridine (PYRIDIUM) 200 mg tablet, Take 1 tablet (200 mg total) by mouth every 8 (eight) hours as needed for bladder spasms, Disp: 9 tablet, Rfl: 0  •  Synthroid 125 MCG tablet, Take 1 tablet (125 mcg total) by mouth daily, Disp: 90 tablet, Rfl: 0    Review of Systems:  Review of Systems   Constitutional: Negative. HENT: Negative. Respiratory: Negative. Cardiovascular: Negative. Gastrointestinal: Negative. Genitourinary: Negative. Musculoskeletal: Negative. Neurological: Negative. Psychiatric/Behavioral: Negative. Physical Exam:  /70   Ht 5' 7" (1.702 m)   Wt 92.5 kg (204 lb)   LMP  (LMP Unknown)   BMI 31.95 kg/m²    Physical Exam  Constitutional:       Appearance: Normal appearance. Genitourinary:      Bladder and urethral meatus normal.      No lesions in the vagina. Right Labia: No rash, tenderness, lesions, skin changes or Bartholin's cyst.     Left Labia: No tenderness, lesions, skin changes, Bartholin's cyst or rash. Vaginal cuff intact. No vaginal erythema, tenderness or bleeding. Mild vaginal atrophy present. Right Adnexa: absent. Left Adnexa: not tender, not full and no mass present. Cervix is absent. Uterus is absent. Urethral meatus caruncle not present. No urethral tenderness or mass present. Breasts:     Right: No swelling, bleeding, inverted nipple, mass, nipple discharge, skin change or tenderness. Left: No swelling, bleeding, inverted nipple, mass, nipple discharge, skin change or tenderness. HENT:      Head: Normocephalic and atraumatic. Eyes:      Extraocular Movements: Extraocular movements intact. Conjunctiva/sclera: Conjunctivae normal.      Pupils: Pupils are equal, round, and reactive to light.    Cardiovascular:      Rate and Rhythm: Normal rate and regular rhythm. Heart sounds: Normal heart sounds. No murmur heard. Pulmonary:      Effort: Pulmonary effort is normal. No respiratory distress. Breath sounds: Normal breath sounds. No wheezing or rales. Abdominal:      General: There is no distension. Palpations: Abdomen is soft. Tenderness: There is no abdominal tenderness. There is no guarding. Neurological:      General: No focal deficit present. Mental Status: She is alert and oriented to person, place, and time. Skin:     General: Skin is warm and dry. Psychiatric:         Mood and Affect: Mood normal.         Behavior: Behavior normal.   Vitals and nursing note reviewed.

## 2023-07-22 ENCOUNTER — HOSPITAL ENCOUNTER (OUTPATIENT)
Dept: MAMMOGRAPHY | Facility: HOSPITAL | Age: 58
Discharge: HOME | End: 2023-07-22
Attending: OBSTETRICS & GYNECOLOGY
Payer: COMMERCIAL

## 2023-07-22 VITALS — HEIGHT: 67 IN | WEIGHT: 204 LBS | BODY MASS INDEX: 32.02 KG/M2

## 2023-07-22 DIAGNOSIS — Z12.31 ENCOUNTER FOR SCREENING MAMMOGRAM FOR MALIGNANT NEOPLASM OF BREAST: ICD-10-CM

## 2023-07-22 PROCEDURE — 77067 SCR MAMMO BI INCL CAD: CPT

## 2023-07-22 PROCEDURE — 77063 BREAST TOMOSYNTHESIS BI: CPT

## 2023-07-26 DIAGNOSIS — E03.9 HYPOTHYROIDISM, UNSPECIFIED TYPE: ICD-10-CM

## 2023-07-26 RX ORDER — LEVOTHYROXINE SODIUM 125 MCG
125 TABLET ORAL DAILY
Qty: 90 TABLET | Refills: 0 | Status: SHIPPED | OUTPATIENT
Start: 2023-07-26

## 2023-08-03 ENCOUNTER — IOP CHECK (OUTPATIENT)
Dept: URBAN - METROPOLITAN AREA CLINIC 6 | Facility: CLINIC | Age: 58
End: 2023-08-03

## 2023-08-03 DIAGNOSIS — H40.243: ICD-10-CM

## 2023-08-03 DIAGNOSIS — H35.3290: ICD-10-CM

## 2023-08-03 DIAGNOSIS — H40.033: ICD-10-CM

## 2023-08-03 PROCEDURE — 92012 INTRM OPH EXAM EST PATIENT: CPT

## 2023-08-03 PROCEDURE — 92015 DETERMINE REFRACTIVE STATE: CPT

## 2023-08-03 ASSESSMENT — VISUAL ACUITY
OS_SC: 20/40
OS_PH: 20/30
OD_PH: 20/50
OD_SC: 20/60

## 2023-08-03 ASSESSMENT — TONOMETRY
OS_IOP_MMHG: 15
OD_IOP_MMHG: 13

## 2023-08-04 NOTE — MISCELLANEOUS
Message  Return to work or school:   Gutierrez Garcia is under my professional care  She was seen in my office on 11/27/17       Please excuse 11/27/17 and 11/28/17          Signatures   Electronically signed by : Yordy Gan, ; Nov 27 2017  8:52AM EST                       (Author)    Electronically signed by : Yordy Gan, ; Nov 27 2017  8:52AM EST                       (Author) .

## 2023-09-14 ENCOUNTER — TELEPHONE (OUTPATIENT)
Dept: OBGYN CLINIC | Facility: CLINIC | Age: 58
End: 2023-09-14

## 2023-09-14 DIAGNOSIS — N76.0 ACUTE VAGINITIS: Primary | ICD-10-CM

## 2023-09-14 RX ORDER — METRONIDAZOLE 500 MG/1
500 TABLET ORAL EVERY 12 HOURS SCHEDULED
Qty: 14 TABLET | Refills: 0 | Status: SHIPPED | OUTPATIENT
Start: 2023-09-14 | End: 2023-09-21

## 2023-09-14 RX ORDER — FLUCONAZOLE 150 MG/1
150 TABLET ORAL ONCE
Qty: 1 TABLET | Refills: 0 | Status: SHIPPED | OUTPATIENT
Start: 2023-09-14 | End: 2023-09-14

## 2023-09-14 NOTE — TELEPHONE ENCOUNTER
Patient called stating she was on vacation and in water for long periods of time and now c/o vaginal odor an itching. She is requesting medication. Script can be sent to patients pharmacy in chart.

## 2023-09-15 ENCOUNTER — RX RE-CHECK (OUTPATIENT)
Dept: URBAN - METROPOLITAN AREA CLINIC 6 | Facility: CLINIC | Age: 58
End: 2023-09-15

## 2023-09-15 DIAGNOSIS — H52.4: ICD-10-CM

## 2023-09-15 PROCEDURE — 92015 DETERMINE REFRACTIVE STATE: CPT | Mod: NC

## 2023-09-15 ASSESSMENT — KERATOMETRY
OS_AXISANGLE2_DEGREES: 127
OD_K1POWER_DIOPTERS: 45.25
OD_AXISANGLE2_DEGREES: 74
OS_K1POWER_DIOPTERS: 45.50
OS_AXISANGLE_DEGREES: 37
OD_K2POWER_DIOPTERS: 46.00
OD_AXISANGLE_DEGREES: 164
OS_K2POWER_DIOPTERS: 45.75

## 2023-09-15 ASSESSMENT — TONOMETRY
OD_IOP_MMHG: 9
OS_IOP_MMHG: 9

## 2023-09-15 ASSESSMENT — VISUAL ACUITY
OD_SC: 20/60-1
OS_SC: 20/25-2

## 2023-11-29 ENCOUNTER — OFFICE VISIT (OUTPATIENT)
Dept: FAMILY MEDICINE CLINIC | Facility: CLINIC | Age: 58
End: 2023-11-29
Payer: COMMERCIAL

## 2023-11-29 VITALS
BODY MASS INDEX: 32.18 KG/M2 | WEIGHT: 205 LBS | OXYGEN SATURATION: 99 % | SYSTOLIC BLOOD PRESSURE: 119 MMHG | HEIGHT: 67 IN | HEART RATE: 68 BPM | DIASTOLIC BLOOD PRESSURE: 67 MMHG | RESPIRATION RATE: 16 BRPM | TEMPERATURE: 96.6 F

## 2023-11-29 DIAGNOSIS — E03.9 HYPOTHYROIDISM, UNSPECIFIED TYPE: ICD-10-CM

## 2023-11-29 DIAGNOSIS — Z00.00 ANNUAL PHYSICAL EXAM: Primary | ICD-10-CM

## 2023-11-29 DIAGNOSIS — E78.5 DYSLIPIDEMIA: ICD-10-CM

## 2023-11-29 DIAGNOSIS — E55.9 VITAMIN D DEFICIENCY: ICD-10-CM

## 2023-11-29 PROBLEM — H40.9 GLAUCOMA: Status: ACTIVE | Noted: 2023-11-29

## 2023-11-29 PROCEDURE — 99396 PREV VISIT EST AGE 40-64: CPT | Performed by: FAMILY MEDICINE

## 2023-11-29 RX ORDER — DIPHENOXYLATE HYDROCHLORIDE AND ATROPINE SULFATE 2.5; .025 MG/1; MG/1
1 TABLET ORAL DAILY
COMMUNITY

## 2023-11-29 NOTE — PROGRESS NOTES
Assessment/Plan:  Patient given lab requisition for fasting labs as below. Patient to continue present treatment. Instructed to follow a low-fat and a low carbohydrate diet and get regular aerobic exercise walking 150 minutes/week. Follow-up with specialist as scheduled. Return the office in 1 year. No problem-specific Assessment & Plan notes found for this encounter. Diagnoses and all orders for this visit:    Annual physical exam  -     CBC and Platelet; Future  -     Comprehensive metabolic panel; Future  -     UA w Reflex to Microscopic w Reflex to Culture -Lab Collect; Future    Hypothyroidism, unspecified type  -     TSH, 3rd generation with Free T4 reflex; Future    Dyslipidemia  -     Comprehensive metabolic panel; Future  -     Lipid panel; Future    Vitamin D deficiency  -     Vitamin D 25 hydroxy; Future    Other orders  -     multivitamin (THERAGRAN) TABS; Take 1 tablet by mouth daily          Subjective:      Patient ID: Kathy Choudhury is a 59 y.o. female. Patient is here for annual physical and follow-up of chronic conditions. Patient is due for labs. Patient has been feeling well overall and remains quite active walking throughout the day. Patient follows with gynecologist yearly and is up-to-date on mammogram and colonoscopy. Patient recently received yearly flu vaccine and COVID booster at the pharmacy. Thyroid Problem  Presents for follow-up visit. Symptoms include dry skin. Patient reports no anxiety, cold intolerance, constipation, depressed mood, diaphoresis, diarrhea, fatigue, hair loss, heat intolerance, hoarse voice, leg swelling, palpitations, tremors, visual change, weight gain or weight loss. The symptoms have been stable.        The following portions of the patient's history were reviewed and updated as appropriate: allergies, current medications, past family history, past medical history, past social history, past surgical history, and problem list.    Review of Systems   Constitutional:  Negative for diaphoresis, fatigue, weight gain and weight loss. HENT:  Negative for hoarse voice. Cardiovascular:  Negative for palpitations. Gastrointestinal:  Negative for constipation and diarrhea. Endocrine: Negative for cold intolerance and heat intolerance. Neurological:  Negative for tremors. Psychiatric/Behavioral:  The patient is not nervous/anxious. Objective:      /67 (BP Location: Left arm, Patient Position: Sitting, Cuff Size: Standard)   Pulse 68   Temp (!) 96.6 °F (35.9 °C) (Tympanic)   Resp 16   Ht 5' 7" (1.702 m)   Wt 93 kg (205 lb)   LMP  (LMP Unknown)   SpO2 99%   BMI 32.11 kg/m²          Physical Exam  Constitutional:       General: She is not in acute distress. Appearance: Normal appearance. HENT:      Head: Normocephalic. Mouth/Throat:      Mouth: Mucous membranes are moist.   Eyes:      General: No scleral icterus. Conjunctiva/sclera: Conjunctivae normal.   Neck:      Vascular: No carotid bruit. Cardiovascular:      Rate and Rhythm: Normal rate and regular rhythm. Pulmonary:      Effort: Pulmonary effort is normal.      Breath sounds: Normal breath sounds. Abdominal:      Palpations: Abdomen is soft. Tenderness: There is no abdominal tenderness. Musculoskeletal:      Cervical back: Neck supple. No tenderness. Right lower leg: No edema. Left lower leg: No edema. Lymphadenopathy:      Cervical: No cervical adenopathy. Skin:     General: Skin is warm and dry. Neurological:      General: No focal deficit present. Mental Status: She is alert and oriented to person, place, and time. Psychiatric:         Mood and Affect: Mood normal.         Behavior: Behavior normal.         Thought Content:  Thought content normal.         Judgment: Judgment normal.

## 2023-12-04 DIAGNOSIS — E03.9 HYPOTHYROIDISM, UNSPECIFIED TYPE: ICD-10-CM

## 2023-12-04 RX ORDER — LEVOTHYROXINE SODIUM 112 MCG
112 TABLET ORAL
Qty: 90 TABLET | Refills: 0 | Status: SHIPPED | OUTPATIENT
Start: 2023-12-04

## 2024-01-15 ENCOUNTER — OFFICE VISIT (OUTPATIENT)
Dept: URGENT CARE | Age: 59
End: 2024-01-15
Payer: COMMERCIAL

## 2024-01-15 ENCOUNTER — APPOINTMENT (OUTPATIENT)
Dept: RADIOLOGY | Age: 59
End: 2024-01-15
Payer: COMMERCIAL

## 2024-01-15 VITALS
RESPIRATION RATE: 18 BRPM | SYSTOLIC BLOOD PRESSURE: 130 MMHG | HEART RATE: 70 BPM | DIASTOLIC BLOOD PRESSURE: 70 MMHG | OXYGEN SATURATION: 98 % | TEMPERATURE: 98.7 F

## 2024-01-15 DIAGNOSIS — R07.89 CHEST TIGHTNESS: Primary | ICD-10-CM

## 2024-01-15 DIAGNOSIS — R07.89 CHEST TIGHTNESS: ICD-10-CM

## 2024-01-15 PROCEDURE — 71046 X-RAY EXAM CHEST 2 VIEWS: CPT

## 2024-01-15 PROCEDURE — 99213 OFFICE O/P EST LOW 20 MIN: CPT

## 2024-01-15 RX ORDER — METHYLPREDNISOLONE 4 MG/1
TABLET ORAL DAILY
Qty: 21 TABLET | Refills: 0 | Status: SHIPPED | OUTPATIENT
Start: 2024-01-15 | End: 2024-01-21

## 2024-01-15 NOTE — PROGRESS NOTES
[facility]        NAME: Bri Magallon is a 58 y.o. female  : 1965    MRN: [id number]  DATE: January 15, 2024  TIME: 1:59 PM    Assessment and Plan   Chest tightness [R07.89]  1. Chest tightness  XR chest pa & lateral    methylPREDNISolone 4 MG tablet therapy pack      Chest X-ray reviewed, no acute abnormalities noted, awaiting official read.   Please begin medrol dose rupal as directed.   Continue PRN inhaler as directed.   Follow up with PCP if no relief within one week.   Go to ED if symptoms worsen.       Patient Instructions   Acute Cough   WHAT YOU NEED TO KNOW:   An acute cough can last up to 3 weeks. Common causes of an acute cough include a cold, allergies, or a lung infection.  DISCHARGE INSTRUCTIONS:   Return to the emergency department if:   You have trouble breathing or feel short of breath.     You cough up blood, or you see blood in your mucus.     You faint or feel weak or dizzy.     You have chest pain when you cough or take a deep breath.     You have new wheezing.     Contact your healthcare provider if:   You have a fever.     Your cough lasts longer than 4 weeks.     Your symptoms do not improve with treatment.     You have questions or concerns about your condition or care.     Medicines:   Medicines  may be needed to stop the cough, decrease swelling in your airways, or help open your airways. Medicine may also be given to help you cough up mucus. Ask your healthcare provider what over-the-counter medicines you can take. If you have an infection caused by bacteria, you may need antibiotics.     Take your medicine as directed.  Contact your healthcare provider if you think your medicine is not helping or if you have side effects. Tell your provider if you are allergic to any medicine. Keep a list of the medicines, vitamins, and herbs you take. Include the amounts, and when and why you take them. Bring the list or the pill bottles to follow-up visits. Carry your medicine list  with you in case of an emergency.     Manage your symptoms:   Do not smoke and stay away from others who smoke.  Nicotine and other chemicals in cigarettes and cigars can cause lung damage and make your cough worse. Ask your healthcare provider for information if you currently smoke and need help to quit. E-cigarettes or smokeless tobacco still contain nicotine. Talk to your healthcare provider before you use these products.     Drink extra liquids as directed.  Liquids will help thin and loosen mucus so you can cough it up. Liquids will also help prevent dehydration. Examples of good liquids to drink include water, fruit juice, and broth. Do not drink liquids that contain caffeine. Caffeine can increase your risk for dehydration. Ask your healthcare provider how much liquid to drink each day.     Rest as directed.  Do not do activities that make your cough worse, such as exercise.     Use a humidifier or vaporizer.  Use a cool mist humidifier or a vaporizer to increase air moisture in your home. This may make it easier for you to breathe and help decrease your cough.     Eat 2 to 5 mL of honey 2 times each day.  Honey can help thin mucus and decrease your cough.     Use cough drops or lozenges.  These can help decrease throat irritation and your cough.     Follow up with your healthcare provider as directed:  Write down your questions so you remember to ask them during your visits.  © Copyright [employer]ative 2023 Information is for End User's use only and may not be sold, redistributed or otherwise used for commercial purposes.  The above information is an  only. It is not intended as medical advice for individual conditions or treatments. Talk to your doctor, nurse or pharmacist before following any medical regimen to see if it is safe and effective for you.       Follow up with PCP in 3-5 days.  Proceed to  ER if symptoms worsen.    Chief Complaint     Chief Complaint   Patient presents with    Cold  Like Symptoms     C/o cough, chest congestion, throat irritation since yesterday         History of Present Illness       58 year old female with PMH significant for asthma presents for evaluation of cough, chest tightness since yesterday. She reports that symptoms began while brushing her teeth in the shower, where she aspirated some tooth paste which triggered a coughing fit. She has since developed chest tightness and rhinorrhea. She took her PRN inhaler with some relief. She denies fever, chest pain, palpitations, lightheadedness/dizziness.         Review of Systems   Review of Systems   Constitutional:  Negative for fatigue and fever.   HENT:  Positive for rhinorrhea. Negative for congestion, ear discharge, ear pain, postnasal drip, sinus pressure, sinus pain, sneezing and sore throat.    Eyes: Negative.  Negative for pain, discharge, redness and itching.   Respiratory:  Positive for cough and chest tightness. Negative for apnea, choking, shortness of breath, wheezing and stridor.    Cardiovascular: Negative.  Negative for chest pain and palpitations.   Gastrointestinal: Negative.  Negative for diarrhea, nausea and vomiting.   Endocrine: Negative.  Negative for polydipsia, polyphagia and polyuria.   Genitourinary: Negative.  Negative for decreased urine volume and flank pain.   Musculoskeletal: Negative.  Negative for arthralgias, back pain, myalgias, neck pain and neck stiffness.   Skin: Negative.  Negative for color change and rash.   Allergic/Immunologic: Negative.  Negative for environmental allergies.   Neurological: Negative.  Negative for dizziness, facial asymmetry, light-headedness, numbness and headaches.   Hematological: Negative.  Negative for adenopathy.   Psychiatric/Behavioral: Negative.           Current Medications       Current Outpatient Medications:     Cholecalciferol (VITAMIN D3) 1000 units CAPS, Take 2 capsules by mouth daily, Disp: , Rfl:     methylPREDNISolone 4 MG tablet therapy pack,  Take 6 tablets (24 mg total) by mouth daily for 1 day, THEN 5 tablets (20 mg total) daily for 1 day, THEN 4 tablets (16 mg total) daily for 1 day, THEN 3 tablets (12 mg total) daily for 1 day, THEN 2 tablets (8 mg total) daily for 1 day, THEN 1 tablet (4 mg total) daily for 1 day. Use as directed on package., Disp: 21 tablet, Rfl: 0    multivitamin (THERAGRAN) TABS, Take 1 tablet by mouth daily, Disp: , Rfl:     Synthroid 112 MCG tablet, Take 1 tablet (112 mcg total) by mouth daily in the early morning, Disp: 90 tablet, Rfl: 0    phenazopyridine (PYRIDIUM) 200 mg tablet, Take 1 tablet (200 mg total) by mouth every 8 (eight) hours as needed for bladder spasms (Patient not taking: Reported on 2023), Disp: 9 tablet, Rfl: 0    Current Allergies     Allergies as of 01/15/2024 - Reviewed 01/15/2024   Allergen Reaction Noted    Oxycodone Hives 2015    Oxycodone-acetaminophen er  2015            The following portions of the patient's history were reviewed and updated as appropriate: allergies, current medications, past family history, past medical history, past social history, past surgical history and problem list.     Past Medical History:   Diagnosis Date    Allergic     Colon polyp     Disease of thyroid gland     Diverticulitis of colon     Endometriosis     Fibroids, subserous     GERD (gastroesophageal reflux disease)     Hypothyroidism        Past Surgical History:   Procedure Laterality Date    CHOLECYSTECTOMY      COLONOSCOPY      per pt    HYSTERECTOMY      right ovary removed also    OOPHORECTOMY Right     TUBAL LIGATION         Family History   Problem Relation Age of Onset    Heart disease Mother             Hypertension Father             Kidney disease Father     Heart disease Father             Heart disease Sister         CARDIAC DISORDER    Diabetes Sister         MELLITUS    Hypothyroidism Sister         HYPOTHYROIDISM, GOITROUS    Thyroid disease  Sister     Glaucoma Sister     No Known Problems Daughter     No Known Problems Maternal Grandmother     No Known Problems Maternal Grandfather     No Known Problems Paternal Grandmother     No Known Problems Paternal Grandfather     Stroke Sister             No Known Problems Maternal Aunt     No Known Problems Maternal Aunt     No Known Problems Maternal Aunt     No Known Problems Maternal Aunt     No Known Problems Maternal Aunt          Medications have been verified.        Objective   /70   Pulse 70   Temp 98.7 °F (37.1 °C) (Temporal)   Resp 18   LMP  (LMP Unknown)   SpO2 98%        Physical Exam     Physical Exam  Vitals and nursing note reviewed.   Constitutional:       General: She is not in acute distress.     Appearance: Normal appearance. She is not ill-appearing, toxic-appearing or diaphoretic.      Interventions: She is not intubated.  HENT:      Head: Normocephalic and atraumatic.      Right Ear: External ear normal.      Left Ear: External ear normal.      Nose: Nose normal. No congestion or rhinorrhea.      Mouth/Throat:      Mouth: Mucous membranes are moist.   Eyes:      Extraocular Movements: Extraocular movements intact.      Conjunctiva/sclera: Conjunctivae normal.      Pupils: Pupils are equal, round, and reactive to light.   Cardiovascular:      Rate and Rhythm: Normal rate and regular rhythm.      Pulses: Normal pulses.      Heart sounds: Normal heart sounds, S1 normal and S2 normal. Heart sounds not distant. No murmur heard.     No friction rub. No gallop.   Pulmonary:      Effort: Pulmonary effort is normal. No tachypnea, bradypnea, accessory muscle usage, prolonged expiration, respiratory distress or retractions. She is not intubated.      Breath sounds: Normal breath sounds. No stridor, decreased air movement or transmitted upper airway sounds. No decreased breath sounds, wheezing, rhonchi or rales.   Abdominal:      General: Bowel sounds are normal.      Palpations:  Abdomen is soft.      Tenderness: There is no abdominal tenderness. There is no guarding or rebound.   Musculoskeletal:         General: Normal range of motion.      Cervical back: Normal range of motion and neck supple. No tenderness.   Skin:     General: Skin is warm and dry.      Capillary Refill: Capillary refill takes less than 2 seconds.   Neurological:      General: No focal deficit present.      Mental Status: She is alert and oriented to person, place, and time.      Cranial Nerves: No cranial nerve deficit.   Psychiatric:         Mood and Affect: Mood normal.         Behavior: Behavior normal.

## 2024-01-15 NOTE — PATIENT INSTRUCTIONS
Chest X-ray reviewed, no acute abnormalities noted, awaiting official read.   Please begin medrol dose rupal as directed.   Continue PRN inhaler as directed.   Follow up with PCP if no relief within one week.   Go to ED if symptoms worsen.

## 2024-02-15 ENCOUNTER — TELEPHONE (OUTPATIENT)
Age: 59
End: 2024-02-15

## 2024-02-15 NOTE — TELEPHONE ENCOUNTER
Patient called and wanted to see if Dr. Luna could recommend a dermatologist.  Please advise @ [phone]

## 2024-02-15 NOTE — TELEPHONE ENCOUNTER
Received call from patient requesting a new patient appt for a mole on her back.     The wait/cancellation process was explained to her as well as the other options to go see her PCP, urgent care,ER or contact another derm to see if she could get in sooner.     Patient stated she will speak with her PCP to see where he can refer her to.

## 2024-02-15 NOTE — TELEPHONE ENCOUNTER
Rec'd call from patient requesting NEW for MOLE ON HER BACK.     Wait list process was explained and understanding was verbalized.     Created wait list for patient.

## 2024-02-22 ENCOUNTER — OFFICE VISIT (OUTPATIENT)
Dept: PODIATRY | Facility: CLINIC | Age: 59
End: 2024-02-22
Payer: COMMERCIAL

## 2024-02-22 VITALS — SYSTOLIC BLOOD PRESSURE: 112 MMHG | HEART RATE: 73 BPM | DIASTOLIC BLOOD PRESSURE: 75 MMHG

## 2024-02-22 DIAGNOSIS — B35.1 ONYCHOMYCOSIS: Primary | ICD-10-CM

## 2024-02-22 PROCEDURE — 99203 OFFICE O/P NEW LOW 30 MIN: CPT | Performed by: PODIATRIST

## 2024-02-22 RX ORDER — TERBINAFINE HYDROCHLORIDE 250 MG/1
250 TABLET ORAL DAILY
Qty: 84 TABLET | Refills: 0 | Status: SHIPPED | OUTPATIENT
Start: 2024-02-22 | End: 2024-05-16

## 2024-02-22 NOTE — PROGRESS NOTES
Assessment/Plan:    Explained to patient that her nail dystrophy may be onychomycosis but we cannot rule out trauma.  Patient desires to try oral Lamisil.  Prescribed 84 tablets.  Patient to take medication on a daily basis.  She will be reassessed in 2 months.  She understands that I will take 9 months to know if it is effective and that has a 60% success rate.    No problem-specific Assessment & Plan notes found for this encounter.       Diagnoses and all orders for this visit:    Onychomycosis          Subjective:      Patient ID: Bri Magallon is a 58 y.o. female.    HPI    Patient, a 58-year-old female presents with concern regarding the conditions of each great toenail.  She feels as if the nails are discoloring and loosening from the nailbed.  She has had trauma but it was years back.  The second and third toenails of each foot are also thickened.  There has been no history of athlete's foot.    I personally reviewed a comprehensive metabolic panel dated 12/2/2023.  Liver enzymes were within normal limits.  The protein was decreased at 6.2 and total bilirubin elevated at 1.6.    The following portions of the patient's history were reviewed and updated as appropriate: allergies, current medications, past family history, past medical history, past social history, past surgical history, and problem list.    Review of Systems   Gastrointestinal:         GERD   Skin:  Negative for rash.   Psychiatric/Behavioral: Negative.               Objective:      /75   Pulse 73   LMP  (LMP Unknown)          Physical Exam  Constitutional:       Appearance: Normal appearance.   Cardiovascular:      Pulses: Normal pulses.   Musculoskeletal:         General: Normal range of motion.   Skin:     Comments: Each great toenail is yellow in color and exhibits mild onycholysis.  The nails appear brittle.  Second and third toenails bilateral are thickened dystrophic.  No evidence of tinea pedis.

## 2024-02-27 ENCOUNTER — OFFICE VISIT (OUTPATIENT)
Dept: FAMILY MEDICINE CLINIC | Facility: CLINIC | Age: 59
End: 2024-02-27
Payer: COMMERCIAL

## 2024-02-27 ENCOUNTER — APPOINTMENT (OUTPATIENT)
Dept: RADIOLOGY | Age: 59
End: 2024-02-27
Payer: COMMERCIAL

## 2024-02-27 VITALS
BODY MASS INDEX: 32.33 KG/M2 | RESPIRATION RATE: 16 BRPM | HEIGHT: 67 IN | HEART RATE: 76 BPM | TEMPERATURE: 96.8 F | OXYGEN SATURATION: 97 % | SYSTOLIC BLOOD PRESSURE: 118 MMHG | WEIGHT: 206 LBS | DIASTOLIC BLOOD PRESSURE: 78 MMHG

## 2024-02-27 DIAGNOSIS — L57.0 KERATOSIS: Primary | ICD-10-CM

## 2024-02-27 DIAGNOSIS — S46.811A TRAPEZIUS STRAIN, RIGHT, INITIAL ENCOUNTER: ICD-10-CM

## 2024-02-27 DIAGNOSIS — M25.511 ACUTE PAIN OF RIGHT SHOULDER: ICD-10-CM

## 2024-02-27 PROCEDURE — 73030 X-RAY EXAM OF SHOULDER: CPT

## 2024-02-27 PROCEDURE — 99214 OFFICE O/P EST MOD 30 MIN: CPT | Performed by: FAMILY MEDICINE

## 2024-02-27 RX ORDER — METHOCARBAMOL 500 MG/1
500 TABLET, FILM COATED ORAL
Qty: 30 TABLET | Refills: 0 | Status: SHIPPED | OUTPATIENT
Start: 2024-02-27

## 2024-02-27 NOTE — PROGRESS NOTES
Assessment/Plan:  Patient is being sent for x-ray of the right shoulder.  Will heed results.  Patient be started on methocarbamol 500 mg 1 nightly as needed, caution regarding drowsiness.  Patient may continue Tylenol or Advil as needed and ice as needed.  Patient is being referred for physical therapy evaluation and treatment if not improving.  Patient to keep appointment with Dr. Hall, dermatologist in July.  Return to the office in 3 to 4 weeks or call sooner as needed.  No problem-specific Assessment & Plan notes found for this encounter.       Diagnoses and all orders for this visit:    Keratosis    Acute pain of right shoulder  -     XR shoulder 2+ vw right; Future  -     methocarbamol (ROBAXIN) 500 mg tablet; Take 1 tablet (500 mg total) by mouth daily at bedtime as needed for muscle spasms  -     Ambulatory Referral to Physical Therapy; Future    Trapezius strain, right, initial encounter  -     XR shoulder 2+ vw right; Future  -     methocarbamol (ROBAXIN) 500 mg tablet; Take 1 tablet (500 mg total) by mouth daily at bedtime as needed for muscle spasms  -     Ambulatory Referral to Physical Therapy; Future          Subjective:      Patient ID: Bri Magallon is a 58 y.o. female.    Patient complains of right posterior shoulder pain for the past 3 weeks.  She denies any specific injury or fall and states she woke up with the pain 1 morning and thinks she slept on it wrong.  She denies pain, numbness, tingling or weakness down her arm.  She has treated this with ibuprofen, Tylenol and ice with some relief.  Patient is left-handed.  Patient also concerned about a mole on her back which is been present for many years without significant change although recently complains of it feeling itchy.  She denies any bleeding.  Patient saw dermatologist 10 years ago and was told it was not significant and has an appointment with Dr. Hall, dermatologist in July.    Shoulder Pain   The pain is present in the  "right shoulder. This is a new problem. The current episode started 1 to 4 weeks ago. The problem occurs intermittently. The problem has been gradually worsening. The quality of the pain is described as burning and aching. Pertinent negatives include no inability to bear weight, joint locking, joint swelling, limited range of motion, numbness, stiffness or tingling. The symptoms are aggravated by activity and lying down. She has tried cold, acetaminophen and NSAIDS for the symptoms. The treatment provided moderate relief.       The following portions of the patient's history were reviewed and updated as appropriate: allergies, current medications, past family history, past medical history, past social history, past surgical history, and problem list.    Review of Systems   Musculoskeletal:  Negative for stiffness.   Neurological:  Negative for tingling and numbness.         Objective:      /78   Pulse 76   Temp (!) 96.8 °F (36 °C)   Resp 16   Ht 5' 7\" (1.702 m)   Wt 93.4 kg (206 lb)   LMP  (LMP Unknown)   SpO2 97%   BMI 32.26 kg/m²          Physical Exam  Constitutional:       General: She is not in acute distress.     Appearance: Normal appearance.   HENT:      Head: Normocephalic.      Mouth/Throat:      Mouth: Mucous membranes are moist.   Eyes:      General: No scleral icterus.     Conjunctiva/sclera: Conjunctivae normal.   Cardiovascular:      Rate and Rhythm: Normal rate and regular rhythm.   Pulmonary:      Effort: Pulmonary effort is normal.      Breath sounds: Normal breath sounds.   Abdominal:      Palpations: Abdomen is soft.      Tenderness: There is no abdominal tenderness.   Musculoskeletal:         General: Tenderness present.      Cervical back: Normal range of motion and neck supple. No tenderness.      Right lower leg: No edema.      Left lower leg: No edema.      Comments: Right shoulder range of motion intact.  Negative joint tenderness.  Positive tenderness right trapezius muscle. "   Lymphadenopathy:      Cervical: No cervical adenopathy.   Skin:     General: Skin is warm and dry.      Findings: Lesion present.      Comments: Dark brown keratotic skin lesion left thoracic area.   Neurological:      General: No focal deficit present.      Mental Status: She is alert and oriented to person, place, and time.   Psychiatric:         Mood and Affect: Mood normal.         Behavior: Behavior normal.         Thought Content: Thought content normal.         Judgment: Judgment normal.

## 2024-03-06 DIAGNOSIS — E03.9 HYPOTHYROIDISM, UNSPECIFIED TYPE: ICD-10-CM

## 2024-03-06 RX ORDER — LEVOTHYROXINE SODIUM 112 MCG
112 TABLET ORAL EVERY MORNING
Qty: 90 TABLET | Refills: 0 | Status: SHIPPED | OUTPATIENT
Start: 2024-03-06

## 2024-04-25 ENCOUNTER — OFFICE VISIT (OUTPATIENT)
Dept: PODIATRY | Facility: CLINIC | Age: 59
End: 2024-04-25
Payer: COMMERCIAL

## 2024-04-25 VITALS
DIASTOLIC BLOOD PRESSURE: 74 MMHG | BODY MASS INDEX: 32.33 KG/M2 | WEIGHT: 206 LBS | HEART RATE: 84 BPM | SYSTOLIC BLOOD PRESSURE: 113 MMHG | HEIGHT: 67 IN | RESPIRATION RATE: 18 BRPM

## 2024-04-25 DIAGNOSIS — B35.1 ONYCHOMYCOSIS: Primary | ICD-10-CM

## 2024-04-25 PROCEDURE — 99212 OFFICE O/P EST SF 10 MIN: CPT | Performed by: PODIATRIST

## 2024-04-25 NOTE — PROGRESS NOTES
Patient presents to assess her response to oral Lamisil.  Patient has taken approximately 60 tablets of the 84 provided.  No side effects reported.  She believes that there has been modest improvement at the bases of each toenail.  Patient told to complete her prescription and that we will not know any result for another 6 to 7 months.  She will be rescheduled in 7 months

## 2024-05-07 ENCOUNTER — TELEPHONE (OUTPATIENT)
Age: 59
End: 2024-05-07

## 2024-05-07 DIAGNOSIS — U07.1 COVID-19 VIRUS INFECTION: Primary | ICD-10-CM

## 2024-05-07 RX ORDER — NIRMATRELVIR AND RITONAVIR 300-100 MG
3 KIT ORAL 2 TIMES DAILY
Qty: 30 TABLET | Refills: 0 | Status: SHIPPED | OUTPATIENT
Start: 2024-05-07 | End: 2024-05-12

## 2024-05-07 NOTE — TELEPHONE ENCOUNTER
Pt called the office to inform the provider that she tested positive for COVID today. Pt states she has symptoms of a runny nose, head congestion, and a cough. Pt would like paxlovid called in to the pharmacy for her. Please review and advise.

## 2024-05-10 ENCOUNTER — TELEPHONE (OUTPATIENT)
Age: 59
End: 2024-05-10

## 2024-05-10 ENCOUNTER — NURSE TRIAGE (OUTPATIENT)
Age: 59
End: 2024-05-10

## 2024-05-10 NOTE — TELEPHONE ENCOUNTER
"Patient contacted the office this morning stating that she has been experiencing diarrhea and chills since Tuesday. I did relay that a common side effect of the Paxlovid can be diarrhea. Patient has been taking Liquid IV, Gatorade and water. Staying hydrated but can barely keep much down, eating dry toast/bland foods. Stated that her anal area is raw with the amount of bowel movements she is having daily. She has not taken anything additional at this time, took Paxlovid this morning at 7AM. She was unsure if she could take Imodium or anything OTC to try to help. Looking for provider recommendation.      Reason for Disposition   Caller has URGENT medicine question about med that PCP or specialist prescribed and triager unable to answer question    Answer Assessment - Initial Assessment Questions  1. NAME of MEDICATION: \"What medicine are you calling about?\"      nirmatrelvir & ritonavir (Paxlovid, 300/100,) tablet therapy pack  2. QUESTION: \"What is your question?\" (e.g., medication refill, side effect)      Possible side effect from medication  3. PRESCRIBING HCP: \"Who prescribed it?\" Reason: if prescribed by specialist, call should be referred to that group.      Dr. Luna  4. SYMPTOMS: \"Do you have any symptoms?\"      Diarrhea is the worst symptoms and chills  5. SEVERITY: If symptoms are present, ask \"Are they mild, moderate or severe?\"     Severe, multiple bowel movements a day (has gone already 4 times since this morning)    Protocols used: Medication Question Call-ADULT-[place]    "

## 2024-05-10 NOTE — TELEPHONE ENCOUNTER
Regarding: dierrhea  ----- Message from Jose Juan Hand MA sent at 5/10/2024  8:57 AM EDT -----  COVID +, on Paxlovid . She has been with severe diarrhea and chills since Tuesday 5/7/24. Would like to knoaw what else she can do to help with her symptoms.

## 2024-06-06 ENCOUNTER — TELEPHONE (OUTPATIENT)
Age: 59
End: 2024-06-06

## 2024-06-06 NOTE — TELEPHONE ENCOUNTER
Patient called, states she will be having a dermatology procedure (microblading)done, and a letter of medical clearance is needed. Patient request 01/2023 Doctors letter to be updated. Upon chart review/letters confirmed previous letter 01/06/2024, on file. Patient last office vis 02/27/2024,Please advise Patient at [phone], if any further questions.

## 2024-06-07 ENCOUNTER — PROCEDURE VISIT (OUTPATIENT)
Dept: FAMILY MEDICINE CLINIC | Facility: CLINIC | Age: 59
End: 2024-06-07
Payer: COMMERCIAL

## 2024-06-07 VITALS
OXYGEN SATURATION: 98 % | SYSTOLIC BLOOD PRESSURE: 114 MMHG | WEIGHT: 207 LBS | HEIGHT: 67 IN | BODY MASS INDEX: 32.49 KG/M2 | DIASTOLIC BLOOD PRESSURE: 63 MMHG | TEMPERATURE: 96.7 F | HEART RATE: 90 BPM

## 2024-06-07 DIAGNOSIS — E03.9 HYPOTHYROIDISM, UNSPECIFIED TYPE: Primary | ICD-10-CM

## 2024-06-07 DIAGNOSIS — Z23 ENCOUNTER FOR IMMUNIZATION: ICD-10-CM

## 2024-06-07 PROCEDURE — 90677 PCV20 VACCINE IM: CPT

## 2024-06-07 PROCEDURE — 90471 IMMUNIZATION ADMIN: CPT

## 2024-06-07 PROCEDURE — 99213 OFFICE O/P EST LOW 20 MIN: CPT | Performed by: FAMILY MEDICINE

## 2024-06-07 NOTE — LETTER
June 7, 2024     Patient: Bri Magallon  YOB: 1965  Date of Visit: 6/7/2024      To Whom it May Concern:    Bri Magallon is under my professional care. Patient is cleared for microblading procedure without restrictions.    If you have any questions or concerns, please don't hesitate to call.         Sincerely,          Alexandra Conde, DO        CC: No Recipients

## 2024-06-07 NOTE — ASSESSMENT & PLAN NOTE
Clearance note is provided for cosmetic eyebrow microplating.    Most recent TSH is within normal range.  Recommend continuing Synthroid 112 MCG daily.    Recommend repeat TSH for further evaluation.

## 2024-06-07 NOTE — PROGRESS NOTES
Ambulatory Visit  Name: Bri Magallon      : 1965      MRN: [id number]  Encounter Provider: Alexandra Conde DO  Encounter Date: 2024   Encounter department: [facility]    Assessment & Plan   1. Hypothyroidism, unspecified type  Assessment & Plan:  Clearance note is provided for cosmetic eyebrow microplating.    Most recent TSH is within normal range.  Recommend continuing Synthroid 112 MCG daily.    Recommend repeat TSH for further evaluation.  Orders:  -     TSH, 3rd generation with Free T4 reflex; Future  -     TSH, 3rd generation with Free T4 reflex  2. Encounter for immunization  -     Pneumococcal Conjugate Vaccine 20-valent (Pcv20)       History of Present Illness     Bri is a 58-year-old female with past medical history of asthma, GERD, and hypothyroidism who presents today for follow-up of chronic conditions.    She is requesting clearance letter to complete eyebrow microplating due to her history of hypothyroidism and taking levothyroxine.  She denies any issues with this in the past.  She denies any symptomatic concerns at today's visit.        Review of Systems   Constitutional:  Negative for fatigue and fever.   HENT:  Negative for congestion and sore throat.    Respiratory:  Negative for cough and shortness of breath.    Cardiovascular:  Negative for chest pain and palpitations.   Gastrointestinal:  Negative for abdominal pain, blood in stool, constipation, diarrhea, nausea and vomiting.   Genitourinary:  Negative for dysuria and hematuria.   Musculoskeletal:  Negative for arthralgias and myalgias.   Skin:  Negative for rash.   Neurological:  Negative for dizziness and headaches.   Psychiatric/Behavioral:  Negative for dysphoric mood. The patient is not nervous/anxious.      Medical History Reviewed by provider this encounter:  Tobacco  Allergies  Meds  Problems  Med Hx  Surg Hx  Fam Hx       Past Medical History   Past Medical History:   Diagnosis Date    • Allergic    • Colon polyp    • Disease of thyroid gland    • Diverticulitis of colon    • Endometriosis    • Fibroids, subserous    • GERD (gastroesophageal reflux disease)    • Hypothyroidism      Past Surgical History:   Procedure Laterality Date   • CHOLECYSTECTOMY     • COLONOSCOPY  2015    per pt   • HYSTERECTOMY  2008    right ovary removed also   • OOPHORECTOMY Right    • TUBAL LIGATION       Family History   Problem Relation Age of Onset   • Heart disease Mother            • Hypertension Father            • Kidney disease Father    • Heart disease Father            • Heart disease Sister         CARDIAC DISORDER   • Diabetes Sister         MELLITUS   • Hypothyroidism Sister         HYPOTHYROIDISM, GOITROUS   • Thyroid disease Sister    • Glaucoma Sister    • No Known Problems Daughter    • No Known Problems Maternal Grandmother    • No Known Problems Maternal Grandfather    • No Known Problems Paternal Grandmother    • No Known Problems Paternal Grandfather    • Stroke Sister            • No Known Problems Maternal Aunt    • No Known Problems Maternal Aunt    • No Known Problems Maternal Aunt    • No Known Problems Maternal Aunt    • No Known Problems Maternal Aunt      Current Outpatient Medications on File Prior to Visit   Medication Sig Dispense Refill   • Cholecalciferol (VITAMIN D3) 1000 units CAPS Take 2 capsules by mouth daily     • methocarbamol (ROBAXIN) 500 mg tablet Take 1 tablet (500 mg total) by mouth daily at bedtime as needed for muscle spasms 30 tablet 0   • multivitamin (THERAGRAN) TABS Take 1 tablet by mouth daily     • Synthroid 112 MCG tablet TAKE 1 TABLET BY MOUTH EVERY MORNING 90 tablet 0   • phenazopyridine (PYRIDIUM) 200 mg tablet Take 1 tablet (200 mg total) by mouth every 8 (eight) hours as needed for bladder spasms (Patient not taking: Reported on 2023) 9 tablet 0     No current facility-administered medications on file prior to visit.  "    Allergies   Allergen Reactions   • Oxycodone Hives   • Oxycodone-Acetaminophen Er       Current Outpatient Medications on File Prior to Visit   Medication Sig Dispense Refill   • Cholecalciferol (VITAMIN D3) 1000 units CAPS Take 2 capsules by mouth daily     • methocarbamol (ROBAXIN) 500 mg tablet Take 1 tablet (500 mg total) by mouth daily at bedtime as needed for muscle spasms 30 tablet 0   • multivitamin (THERAGRAN) TABS Take 1 tablet by mouth daily     • Synthroid 112 MCG tablet TAKE 1 TABLET BY MOUTH EVERY MORNING 90 tablet 0   • phenazopyridine (PYRIDIUM) 200 mg tablet Take 1 tablet (200 mg total) by mouth every 8 (eight) hours as needed for bladder spasms (Patient not taking: Reported on 2023) 9 tablet 0     No current facility-administered medications on file prior to visit.      Social History     Tobacco Use   • Smoking status: Former     Current packs/day: 0.00     Average packs/day: 0.3 packs/day for 15.0 years (3.8 ttl pk-yrs)     Types: Cigarettes     Start date: 1989     Quit date: 2004     Years since quittin.8   • Smokeless tobacco: Never   • Tobacco comments:     quit    Vaping Use   • Vaping status: Never Used   Substance and Sexual Activity   • Alcohol use: Not Currently     Comment: social   • Drug use: No   • Sexual activity: Yes     Partners: Male     Birth control/protection: Post-menopausal     Objective     /63 (BP Location: Left arm, Patient Position: Sitting, Cuff Size: Standard)   Pulse 90   Temp (!) 96.7 °F (35.9 °C) (Tympanic)   Ht 5' 7\" (1.702 m)   Wt 93.9 kg (207 lb)   LMP  (LMP Unknown)   SpO2 98%   BMI 32.42 kg/m²     Physical Exam  Vitals reviewed.   Constitutional:       General: She is not in acute distress.     Appearance: She is well-developed.   HENT:      Head: Normocephalic and atraumatic.      Right Ear: External ear normal.      Left Ear: External ear normal.      Nose: Nose normal.   Eyes:      Conjunctiva/sclera: Conjunctivae " normal.   Cardiovascular:      Rate and Rhythm: Normal rate and regular rhythm.      Heart sounds: No murmur heard.  Pulmonary:      Effort: Pulmonary effort is normal. No respiratory distress.      Breath sounds: Normal breath sounds.   Abdominal:      Palpations: Abdomen is soft.      Tenderness: There is no abdominal tenderness.   Musculoskeletal:      Cervical back: Neck supple.      Right lower leg: No edema.      Left lower leg: No edema.   Lymphadenopathy:      Cervical: No cervical adenopathy.   Skin:     General: Skin is warm and dry.   Neurological:      General: No focal deficit present.      Mental Status: She is alert and oriented to person, place, and time.   Psychiatric:         Mood and Affect: Mood normal.         Behavior: Behavior normal.       Administrative Statements

## 2024-06-10 DIAGNOSIS — E03.9 HYPOTHYROIDISM, UNSPECIFIED TYPE: ICD-10-CM

## 2024-06-10 RX ORDER — LEVOTHYROXINE SODIUM 112 MCG
112 TABLET ORAL EVERY MORNING
Qty: 90 TABLET | Refills: 1 | Status: SHIPPED | OUTPATIENT
Start: 2024-06-10

## 2024-06-10 NOTE — TELEPHONE ENCOUNTER
Reason for call:   [x] Refill   [] Prior Auth  [] Other:     Office:   [x] PCP/Provider -Aldo Luna/[facility]   [] Specialty/Provider -     Medication: Synthroid    Dose/Frequency: 112 mcg tablet    Quantity: #90    Pharmacy: [facility]     Does the patient have enough for 3 days?   [x] Yes   [] No - Send as HP to POD

## 2024-06-28 ENCOUNTER — TELEPHONE (OUTPATIENT)
Age: 59
End: 2024-06-28

## 2024-06-28 NOTE — TELEPHONE ENCOUNTER
Patient called to r/s 8/6/24 appt with Dr. Strickland.  First available appt 1/23/25.  Patient keep 8/6/24 juan.

## 2024-07-16 ENCOUNTER — NURSE TRIAGE (OUTPATIENT)
Age: 59
End: 2024-07-16

## 2024-07-16 NOTE — TELEPHONE ENCOUNTER
"Patient called with c/o left ear pain and left throat pain.  States that pain started yesterday morning. Denies any recent swimming but does state that when she washed her hair on Sunday, she thinks that water got into her ear.  She rates pain a 5/10 and throbbing.  She also has c/o left-sided sore throat and that her neck is tender to the touch on the left side. Denies any other symptoms.  Offered office appointment but she declines stating that she does not have transportation to the office.  She is asking if provider could send something to the pharmacy for her.    Please follow up with patient to advise.    She would want to use the [facility] pharmacy in [place] on [facility] [facility].      Reason for Disposition   All other earaches (Exceptions: earache lasting < 1 hour, and earache from air travel)    Answer Assessment - Initial Assessment Questions  1. LOCATION: \"Which ear is involved?\"      Left ear  2. ONSET: \"When did the ear start hurting\"       Started yesterday morning  3. SEVERITY: \"How bad is the pain?\"  (Scale 1-10; mild, moderate or severe)    - MILD (1-3): doesn't interfere with normal activities     - MODERATE (4-7): interferes with normal activities or awakens from sleep     - SEVERE (8-10): excruciating pain, unable to do any normal activities       5/10, throbbing pain  4. URI SYMPTOMS: \"Do you have a runny nose or cough?\"      Denies  5. FEVER: \"Do you have a fever?\" If Yes, ask: \"What is your temperature, how was it measured, and when did it start?\"      Denies  6. CAUSE: \"Have you been swimming recently?\", \"How often do you use Q-TIPS?\", \"Have you had any recent air travel or scuba diving?\"      Has not been swimming but states that when she washed her hair on Sunday she thinks that she got water in her ear.  7. OTHER SYMPTOMS: \"Do you have any other symptoms?\" (e.g., headache, stiff neck, dizziness, vomiting, runny nose, decreased hearing)      Sore throat on left side, neck is tender on left " "side  8. PREGNANCY: \"Is there any chance you are pregnant?\" \"When was your last menstrual period?\"      N/A    Protocols used: Earache-ADULT-[place]    "

## 2024-08-06 ENCOUNTER — ANNUAL EXAM (OUTPATIENT)
Dept: OBGYN CLINIC | Facility: CLINIC | Age: 59
End: 2024-08-06
Payer: COMMERCIAL

## 2024-08-06 VITALS
BODY MASS INDEX: 32.49 KG/M2 | HEIGHT: 67 IN | DIASTOLIC BLOOD PRESSURE: 60 MMHG | SYSTOLIC BLOOD PRESSURE: 102 MMHG | WEIGHT: 207 LBS

## 2024-08-06 DIAGNOSIS — Z01.419 WOMEN'S ANNUAL ROUTINE GYNECOLOGICAL EXAMINATION: Primary | ICD-10-CM

## 2024-08-06 DIAGNOSIS — N94.10 DYSPAREUNIA IN FEMALE: ICD-10-CM

## 2024-08-06 DIAGNOSIS — Z12.31 ENCOUNTER FOR SCREENING MAMMOGRAM FOR MALIGNANT NEOPLASM OF BREAST: ICD-10-CM

## 2024-08-06 DIAGNOSIS — N95.2 VAGINAL ATROPHY: ICD-10-CM

## 2024-08-06 PROCEDURE — S0612 ANNUAL GYNECOLOGICAL EXAMINA: HCPCS | Performed by: OBSTETRICS & GYNECOLOGY

## 2024-08-06 RX ORDER — PRASTERONE 6.5 MG/1
1 INSERT VAGINAL
Qty: 84 EACH | Refills: 3 | Status: SHIPPED | OUTPATIENT
Start: 2024-08-06

## 2024-08-06 NOTE — PROGRESS NOTES
ASSESSMENT & PLAN:   Diagnoses and all orders for this visit:    Women's annual routine gynecological examination    Encounter for screening mammogram for malignant neoplasm of breast  -     Mammo screening bilateral w 3d & cad; Future    Dyspareunia in female  -     Prasterone (Intrarosa) 6.5 MG INST; Insert 1 tablet into the vagina daily at bedtime    Vaginal atrophy  -     Prasterone (Intrarosa) 6.5 MG INST; Insert 1 tablet into the vagina daily at bedtime          The following were reviewed in today's visit: ASCCP guidelines, Gardisil vaccination, STD testing breast self exam, mammography screening ordered, use and side effects of HRT, menopause, exercise, and healthy diet.    Patient to return to office in yearly for annual exam.     All questions have been answered to her satisfaction.        CC:  Annual Gynecologic Examination  Chief Complaint   Patient presents with    Gynecologic Exam     Annual exam, pap not indicated. Pt c/o vaginal dryness, has tried coconut oil, however this has not resolved or made dryness improve.   Pap 10/17/2018 neg/neg HPV  Mammo 21   Colonoscopy 2021, repeat 3 years        HPI: Bri Magallon is a 58 y.o.  who presents for annual gynecologic examination.  She has the following concerns:  vaginal dryness and pain with sex      Health Maintenance:    Exercise: intermittently  Breast exams/breast awareness: yes  Last mammogram:   Colorectal cancer screening: scheduled on Dec    Past Medical History:   Diagnosis Date    Allergic     Colon polyp     Disease of thyroid gland     Diverticulitis of colon     Endometriosis     Fibroids, subserous     GERD (gastroesophageal reflux disease)     Hypothyroidism        Past Surgical History:   Procedure Laterality Date    CHOLECYSTECTOMY      COLONOSCOPY      per pt    HYSTERECTOMY  2008    right ovary removed also    MYOMECTOMY      OOPHORECTOMY Right 2008    TUBAL LIGATION         Past OB/Gyn History:   No LMP  recorded (lmp unknown). Patient has had a hysterectomy.    Menopausal status: postmenopausal  Menopausal symptoms: None    Last Pap: 2018 : no abnormalities  History of abnormal Pap smear: no    Patient is currently sexually active.   STD testing: no  Current contraception: status post hysterectomy      Family History  Family History   Problem Relation Age of Onset    Heart disease Mother             Hypertension Father             Kidney disease Father     Heart disease Father             Heart disease Sister         CARDIAC DISORDER    Diabetes Sister         MELLITUS    Hypothyroidism Sister         HYPOTHYROIDISM, GOITROUS    Thyroid disease Sister     Glaucoma Sister     No Known Problems Daughter     No Known Problems Maternal Grandmother     No Known Problems Maternal Grandfather     No Known Problems Paternal Grandmother     No Known Problems Paternal Grandfather     Stroke Sister             Diabetes Sister     No Known Problems Maternal Aunt     No Known Problems Maternal Aunt     No Known Problems Maternal Aunt     No Known Problems Maternal Aunt     No Known Problems Maternal Aunt        Family history of uterine or ovarian cancer: no  Family history of breast cancer: no  Family history of colon cancer: no    Social History:  Social History     Socioeconomic History    Marital status: /Civil Union     Spouse name: Not on file    Number of children: 1    Years of education: Not on file    Highest education level: Not on file   Occupational History    Not on file   Tobacco Use    Smoking status: Former     Current packs/day: 0.00     Average packs/day: 0.3 packs/day for 15.0 years (3.8 ttl pk-yrs)     Types: Cigarettes     Start date: 1989     Quit date: 2004     Years since quittin.0    Smokeless tobacco: Never    Tobacco comments:     quit    Vaping Use    Vaping status: Never Used   Substance and Sexual Activity    Alcohol use: Not Currently      "Comment: social    Drug use: Never    Sexual activity: Yes     Partners: Male     Birth control/protection: Post-menopausal, Female Sterilization     Comment: Hysterectomy   Other Topics Concern    Not on file   Social History Narrative    ALWAYS USES SEAT BELT    CAFFEINE USE     Social Determinants of Health     Financial Resource Strain: Not on file   Food Insecurity: Not on file   Transportation Needs: Not on file   Physical Activity: Not on file   Stress: Not on file   Social Connections: Not on file   Intimate Partner Violence: Not on file   Housing Stability: Not on file     Domestic violence screen: negative    Allergies:  Allergies   Allergen Reactions    Oxycodone Hives    Oxycodone-Acetaminophen Er        Medications:    Current Outpatient Medications:     Cholecalciferol (VITAMIN D3) 1000 units CAPS, Take 2 capsules by mouth daily, Disp: , Rfl:     multivitamin (THERAGRAN) TABS, Take 1 tablet by mouth daily, Disp: , Rfl:     Prasterone (Intrarosa) 6.5 MG INST, Insert 1 tablet into the vagina daily at bedtime, Disp: 84 each, Rfl: 3    Synthroid 112 MCG tablet, Take 1 tablet (112 mcg total) by mouth every morning, Disp: 90 tablet, Rfl: 1    Review of Systems:  Review of Systems   Constitutional: Negative.    HENT: Negative.     Respiratory: Negative.     Cardiovascular: Negative.    Gastrointestinal: Negative.    Genitourinary:  Positive for dyspareunia and vaginal pain. Negative for genital sores.   Neurological: Negative.          Physical Exam:  /60 (BP Location: Left arm, Patient Position: Sitting, Cuff Size: Standard)   Ht 5' 7\" (1.702 m)   Wt 93.9 kg (207 lb)   LMP  (LMP Unknown)   BMI 32.42 kg/m²    Physical Exam  Constitutional:       Appearance: Normal appearance.   Genitourinary:      Bladder and urethral meatus normal.      No lesions in the vagina.      Right Labia: No rash, tenderness, lesions, skin changes or Bartholin's cyst.     Left Labia: No tenderness, lesions, skin changes, " Bartholin's cyst or rash.     Vaginal cuff intact.     No vaginal erythema, tenderness or bleeding.      Moderate vaginal atrophy present.       Right Adnexa: not tender, not full and no mass present.     Left Adnexa: not tender, not full and no mass present.     Cervix is absent.      Uterus is absent.      Urethral meatus caruncle not present.     No urethral tenderness or mass present.   Breasts:     Right: No swelling, bleeding, inverted nipple, mass, nipple discharge, skin change or tenderness.      Left: No swelling, bleeding, inverted nipple, mass, nipple discharge, skin change or tenderness.   HENT:      Head: Normocephalic and atraumatic.   Eyes:      Extraocular Movements: Extraocular movements intact.      Conjunctiva/sclera: Conjunctivae normal.      Pupils: Pupils are equal, round, and reactive to light.   Cardiovascular:      Rate and Rhythm: Normal rate and regular rhythm.      Heart sounds: Normal heart sounds. No murmur heard.  Pulmonary:      Effort: Pulmonary effort is normal. No respiratory distress.      Breath sounds: Normal breath sounds. No wheezing or rales.   Abdominal:      General: There is no distension.      Palpations: Abdomen is soft.      Tenderness: There is no abdominal tenderness. There is no guarding.   Neurological:      General: No focal deficit present.      Mental Status: She is alert and oriented to person, place, and time.   Skin:     General: Skin is warm and dry.   Psychiatric:         Mood and Affect: Mood normal.         Behavior: Behavior normal.   Vitals and nursing note reviewed.

## 2024-08-10 LAB — TSH SERPL-ACNC: 3.61 UIU/ML (ref 0.45–5.33)

## 2024-11-12 ENCOUNTER — PREP FOR PROCEDURE (OUTPATIENT)
Dept: GASTROENTEROLOGY | Facility: CLINIC | Age: 59
End: 2024-11-12

## 2024-11-12 ENCOUNTER — TELEPHONE (OUTPATIENT)
Dept: GASTROENTEROLOGY | Facility: CLINIC | Age: 59
End: 2024-11-12

## 2024-11-12 DIAGNOSIS — Z86.0100 HX OF COLONIC POLYPS: Primary | ICD-10-CM

## 2024-11-12 DIAGNOSIS — Z12.11 SCREENING FOR COLON CANCER: Primary | ICD-10-CM

## 2024-11-12 NOTE — TELEPHONE ENCOUNTER
11/12/24  Screened by: Annita Mayer    Referring Provider Dalton    Pre- Screening:     There is no height or weight on file to calculate BMI.  Has patient been referred for a routine screening Colonoscopy? yes  Is the patient between 45-75 years old? yes      Previous Colonoscopy yes   If yes:    Date: 2021    Facility: [facility]    Reason:       SCHEDULING STAFF: If the patient is between 45yrs-49yrs, please advise patient to confirm benefits/coverage with their insurance company for a routine screening colonoscopy, some insurance carriers will only cover at 50yrs or older. If the patient is over 75years old, please schedule an office visit.     Does the patient want to see a Gastroenterologist prior to their procedure OR are they having any GI symptoms? no    Has the patient been hospitalized or had abdominal surgery in the past 6 months? no    Does the patient use supplemental oxygen? no    Does the patient take Coumadin, Lovenox, Plavix, Elliquis, Xarelto, or other blood thinning medication? no    Has the patient had a stroke, cardiac event, or stent placed in the past year? no    SCHEDULING STAFF: If patient answers NO to above questions, then schedule procedure. If patient answers YES to above questions, then schedule office appointment.     If patient is between 45yrs - 49yrs, please advise patient that we will have to confirm benefits & coverage with their insurance company for a routine screening colonoscopy.    
normal

## 2024-11-21 ENCOUNTER — OFFICE VISIT (OUTPATIENT)
Dept: PODIATRY | Facility: CLINIC | Age: 59
End: 2024-11-21
Payer: COMMERCIAL

## 2024-11-21 DIAGNOSIS — B35.1 ONYCHOMYCOSIS: Primary | ICD-10-CM

## 2024-11-21 PROCEDURE — 99212 OFFICE O/P EST SF 10 MIN: CPT | Performed by: PODIATRIST

## 2024-11-21 NOTE — PROGRESS NOTES
Patient presents to assess her response to oral Lamisil.  Patient is 9 months post beginning medication primarily for fungal or dystrophic hallux toenails.    There is improvement noted in each great toenail but the distal half of the nail is discolored due to onycholysis.  This implies nail trauma as causative.  No additional treatment is needed.  Patient is pain-free.  Reappoint.

## 2024-12-16 ENCOUNTER — PATIENT MESSAGE (OUTPATIENT)
Dept: GASTROENTEROLOGY | Facility: CLINIC | Age: 59
End: 2024-12-16

## 2024-12-24 ENCOUNTER — ANESTHESIA EVENT (OUTPATIENT)
Dept: ANESTHESIOLOGY | Facility: HOSPITAL | Age: 59
End: 2024-12-24

## 2024-12-24 ENCOUNTER — HOSPITAL ENCOUNTER (OUTPATIENT)
Dept: MAMMOGRAPHY | Facility: HOSPITAL | Age: 59
Discharge: HOME | End: 2024-12-24
Payer: COMMERCIAL

## 2024-12-24 ENCOUNTER — ANESTHESIA (OUTPATIENT)
Dept: ANESTHESIOLOGY | Facility: HOSPITAL | Age: 59
End: 2024-12-24

## 2024-12-24 VITALS — BODY MASS INDEX: 32.49 KG/M2 | HEIGHT: 67 IN | WEIGHT: 207 LBS

## 2024-12-24 DIAGNOSIS — Z12.31 ENCOUNTER FOR SCREENING MAMMOGRAM FOR MALIGNANT NEOPLASM OF BREAST: ICD-10-CM

## 2024-12-24 PROCEDURE — 77063 BREAST TOMOSYNTHESIS BI: CPT

## 2024-12-24 PROCEDURE — 77067 SCR MAMMO BI INCL CAD: CPT

## 2024-12-28 ENCOUNTER — RESULTS FOLLOW-UP (OUTPATIENT)
Dept: OBGYN CLINIC | Facility: CLINIC | Age: 59
End: 2024-12-28

## 2025-01-04 DIAGNOSIS — E03.9 HYPOTHYROIDISM, UNSPECIFIED TYPE: ICD-10-CM

## 2025-01-05 RX ORDER — LEVOTHYROXINE SODIUM 112 MCG
112 TABLET ORAL EVERY MORNING
Qty: 90 TABLET | Refills: 1 | Status: SHIPPED | OUTPATIENT
Start: 2025-01-05

## 2025-01-08 ENCOUNTER — TELEPHONE (OUTPATIENT)
Dept: GASTROENTEROLOGY | Facility: AMBULARY SURGERY CENTER | Age: 60
End: 2025-01-08

## 2025-01-09 ENCOUNTER — ANESTHESIA EVENT (OUTPATIENT)
Dept: ANESTHESIOLOGY | Facility: HOSPITAL | Age: 60
End: 2025-01-09

## 2025-01-09 ENCOUNTER — ANESTHESIA (OUTPATIENT)
Dept: GASTROENTEROLOGY | Facility: AMBULARY SURGERY CENTER | Age: 60
End: 2025-01-09
Payer: COMMERCIAL

## 2025-01-09 ENCOUNTER — ANESTHESIA (OUTPATIENT)
Dept: ANESTHESIOLOGY | Facility: HOSPITAL | Age: 60
End: 2025-01-09

## 2025-01-09 ENCOUNTER — HOSPITAL ENCOUNTER (OUTPATIENT)
Dept: GASTROENTEROLOGY | Facility: AMBULARY SURGERY CENTER | Age: 60
Setting detail: OUTPATIENT SURGERY
End: 2025-01-09
Attending: INTERNAL MEDICINE
Payer: COMMERCIAL

## 2025-01-09 VITALS
OXYGEN SATURATION: 100 % | HEIGHT: 67 IN | TEMPERATURE: 97.3 F | SYSTOLIC BLOOD PRESSURE: 113 MMHG | WEIGHT: 203 LBS | BODY MASS INDEX: 31.86 KG/M2 | DIASTOLIC BLOOD PRESSURE: 55 MMHG | RESPIRATION RATE: 18 BRPM | HEART RATE: 76 BPM

## 2025-01-09 DIAGNOSIS — Z86.0100 HX OF COLONIC POLYPS: ICD-10-CM

## 2025-01-09 PROCEDURE — 45388 COLONOSCOPY W/ABLATION: CPT | Performed by: INTERNAL MEDICINE

## 2025-01-09 PROCEDURE — 45385 COLONOSCOPY W/LESION REMOVAL: CPT | Performed by: INTERNAL MEDICINE

## 2025-01-09 PROCEDURE — 88305 TISSUE EXAM BY PATHOLOGIST: CPT | Performed by: PATHOLOGY

## 2025-01-09 RX ORDER — SODIUM CHLORIDE, SODIUM LACTATE, POTASSIUM CHLORIDE, CALCIUM CHLORIDE 600; 310; 30; 20 MG/100ML; MG/100ML; MG/100ML; MG/100ML
INJECTION, SOLUTION INTRAVENOUS CONTINUOUS PRN
Status: DISCONTINUED | OUTPATIENT
Start: 2025-01-09 | End: 2025-01-09

## 2025-01-09 RX ORDER — LIDOCAINE HYDROCHLORIDE 10 MG/ML
INJECTION, SOLUTION EPIDURAL; INFILTRATION; INTRACAUDAL; PERINEURAL AS NEEDED
Status: DISCONTINUED | OUTPATIENT
Start: 2025-01-09 | End: 2025-01-09

## 2025-01-09 RX ORDER — PROPOFOL 10 MG/ML
INJECTION, EMULSION INTRAVENOUS AS NEEDED
Status: DISCONTINUED | OUTPATIENT
Start: 2025-01-09 | End: 2025-01-09

## 2025-01-09 RX ADMIN — PROPOFOL 100 MG: 10 INJECTION, EMULSION INTRAVENOUS at 11:03

## 2025-01-09 RX ADMIN — PROPOFOL 40 MG: 10 INJECTION, EMULSION INTRAVENOUS at 11:23

## 2025-01-09 RX ADMIN — PROPOFOL 50 MG: 10 INJECTION, EMULSION INTRAVENOUS at 11:09

## 2025-01-09 RX ADMIN — PROPOFOL 50 MG: 10 INJECTION, EMULSION INTRAVENOUS at 11:06

## 2025-01-09 RX ADMIN — PROPOFOL 20 MG: 10 INJECTION, EMULSION INTRAVENOUS at 11:14

## 2025-01-09 RX ADMIN — Medication 40 MG: at 11:10

## 2025-01-09 RX ADMIN — LIDOCAINE HYDROCHLORIDE 30 MG: 10 INJECTION, SOLUTION EPIDURAL; INFILTRATION; INTRACAUDAL; PERINEURAL at 11:03

## 2025-01-09 RX ADMIN — PROPOFOL 40 MG: 10 INJECTION, EMULSION INTRAVENOUS at 11:12

## 2025-01-09 RX ADMIN — PROPOFOL 20 MG: 10 INJECTION, EMULSION INTRAVENOUS at 11:18

## 2025-01-09 RX ADMIN — SODIUM CHLORIDE, SODIUM LACTATE, POTASSIUM CHLORIDE, AND CALCIUM CHLORIDE: .6; .31; .03; .02 INJECTION, SOLUTION INTRAVENOUS at 10:58

## 2025-01-09 RX ADMIN — PROPOFOL 20 MG: 10 INJECTION, EMULSION INTRAVENOUS at 11:16

## 2025-01-09 NOTE — ANESTHESIA PREPROCEDURE EVALUATION
Procedure:  PRE-OP ONLY    Relevant Problems   CARDIO   (+) Hemorrhoids      ENDO   (+) Hypothyroidism      GI/HEPATIC   (+) GERD (gastroesophageal reflux disease)      MUSCULOSKELETAL   (+) Disc degeneration      PULMONARY   (+) Asthma        Physical Exam    Airway    Mallampati score: II  TM Distance: >3 FB  Neck ROM: full     Dental   No notable dental hx     Cardiovascular  Cardiovascular exam normal    Pulmonary  Pulmonary exam normal     Other Findings  post-pubertal.      Anesthesia Plan  ASA Score- 2     Anesthesia Type- IV sedation with anesthesia with ASA Monitors.         Additional Monitors:     Airway Plan:            Plan Factors-Exercise tolerance (METS): >4 METS.    Chart reviewed.  Imaging results reviewed. Existing labs reviewed. Patient summary reviewed.    Patient is not a current smoker.              Induction- intravenous.    Postoperative Plan-     Perioperative Resuscitation Plan - Level 1 - Full Code.       Informed Consent-

## 2025-01-09 NOTE — ANESTHESIA PREPROCEDURE EVALUATION
Procedure:  COLONOSCOPY    Relevant Problems   CARDIO   (+) Hemorrhoids      ENDO   (+) Hypothyroidism      GI/HEPATIC   (+) GERD (gastroesophageal reflux disease)      MUSCULOSKELETAL   (+) Disc degeneration      PULMONARY   (+) Asthma        Physical Exam    Airway    Mallampati score: II  TM Distance: >3 FB  Neck ROM: full     Dental   No notable dental hx     Cardiovascular  Cardiovascular exam normal    Pulmonary  Pulmonary exam normal     Other Findings  post-pubertal.      Anesthesia Plan  ASA Score- 2     Anesthesia Type- IV sedation with anesthesia with ASA Monitors.         Additional Monitors:     Airway Plan:            Plan Factors-Exercise tolerance (METS): >4 METS.    Chart reviewed.  Imaging results reviewed. Existing labs reviewed. Patient summary reviewed.    Patient is not a current smoker.              Induction- intravenous.    Postoperative Plan-     Perioperative Resuscitation Plan - Level 1 - Full Code.       Informed Consent- Anesthetic plan and risks discussed with patient.  I personally reviewed this patient with the CRNA. Discussed and agreed on the Anesthesia Plan with the CRNA..

## 2025-01-09 NOTE — ANESTHESIA POSTPROCEDURE EVALUATION
Post-Op Assessment Note    CV Status:  Stable  Pain Score: 0    Pain management: adequate       Mental Status:  Sleepy   Hydration Status:  Stable   PONV Controlled:  None   Airway Patency:  Patent  Two or more mitigation strategies used for obstructive sleep apnea   Post Op Vitals Reviewed: Yes    No anethesia notable event occurred.    Staff: CRNA           Last Filed PACU Vitals:  Vitals Value Taken Time   Temp 97.3 °F (36.3 °C) 01/09/25 1133   Pulse 75 01/09/25 1133   BP 89/55 01/09/25 1133   Resp 18 01/09/25 1133   SpO2 96 % 01/09/25 1133       Modified Ely:     Vitals Value Taken Time   Activity 0 01/09/25 1133   Respiration 2 01/09/25 1133   Circulation 2 01/09/25 1133   Consciousness 0 01/09/25 1133   Oxygen Saturation 2 01/09/25 1133     Modified Ely Score: 6

## 2025-01-09 NOTE — H&P
History and Physical - [facility]  Bri Magallon 59 y.o. female MRN: [id number]                  HPI: Bri Magallon is a 59 y.o. year old female who presents for colonoscopy for tubular adenoma surveillance. Last colonoscopy completed 2021 with removal of x4 tubular adenomas. Not currently on AP/AC.       REVIEW OF SYSTEMS: Per the HPI, and otherwise unremarkable.    Historical Information   Past Medical History:   Diagnosis Date    Allergic     Colon polyp     Disease of thyroid gland     Diverticulitis of colon     Endometriosis     Fibroids, subserous     GERD (gastroesophageal reflux disease)     Hypothyroidism      Past Surgical History:   Procedure Laterality Date    CHOLECYSTECTOMY      COLONOSCOPY      per pt    HYSTERECTOMY  2008    right ovary removed also    MYOMECTOMY      OOPHORECTOMY Right 2008    TUBAL LIGATION       Social History   Social History     Substance and Sexual Activity   Alcohol Use Not Currently    Comment: social     Social History     Substance and Sexual Activity   Drug Use Never     Social History     Tobacco Use   Smoking Status Former    Current packs/day: 0.00    Average packs/day: 0.3 packs/day for 15.0 years (3.8 ttl pk-yrs)    Types: Cigarettes    Start date: 1989    Quit date: 2004    Years since quittin.4   Smokeless Tobacco Never   Tobacco Comments    quit      Family History   Problem Relation Age of Onset    Heart disease Mother             Hypertension Father             Kidney disease Father     Heart disease Father             Heart disease Sister         CARDIAC DISORDER    Diabetes Sister         MELLITUS    Hypothyroidism Sister         HYPOTHYROIDISM, GOITROUS    Thyroid disease Sister     Glaucoma Sister     Stroke Sister             Diabetes Sister     No Known Problems Daughter     No Known Problems Maternal Grandmother     No Known Problems Maternal Grandfather     No Known  "Problems Paternal Grandmother     No Known Problems Paternal Grandfather     No Known Problems Maternal Aunt     No Known Problems Maternal Aunt     No Known Problems Maternal Aunt     No Known Problems Maternal Aunt     No Known Problems Maternal Aunt        Meds/Allergies       Current Outpatient Medications:     Cholecalciferol (VITAMIN D3) 1000 units CAPS    Synthroid 112 MCG tablet    multivitamin (THERAGRAN) TABS    polyethylene glycol (GOLYTELY) 4000 mL solution    Prasterone (Intrarosa) 6.5 MG INST    Allergies   Allergen Reactions    Oxycodone Hives    Oxycodone-Acetaminophen Er        Objective     /66   Pulse 85   Temp (!) 97.3 °F (36.3 °C) (Temporal)   Resp 17   Ht 5' 7\" (1.702 m)   Wt 92.1 kg (203 lb)   LMP  (LMP Unknown)   SpO2 94%   BMI 31.79 kg/m²       PHYSICAL EXAM    Gen: NAD  Head: NCAT  CV: RRR  CHEST: Clear  ABD: soft, NT/ND  EXT: no edema      ASSESSMENT/PLAN:  This is a 59 y.o. year old female here for colonoscopy, and she is stable and optimized for her procedure.        "

## 2025-01-13 ENCOUNTER — RESULTS FOLLOW-UP (OUTPATIENT)
Dept: GASTROENTEROLOGY | Facility: CLINIC | Age: 60
End: 2025-01-13

## 2025-01-13 PROCEDURE — 88305 TISSUE EXAM BY PATHOLOGIST: CPT | Performed by: PATHOLOGY

## 2025-01-20 NOTE — TELEPHONE ENCOUNTER
Kelly from radiology in [facility] was calling because they got paper work for the pts colonoscopy sent to their fax but it was marked to go to [facility]? Kelly is faxing the papers back to our office

## 2025-02-13 ENCOUNTER — OFFICE VISIT (OUTPATIENT)
Dept: FAMILY MEDICINE CLINIC | Facility: CLINIC | Age: 60
End: 2025-02-13
Payer: COMMERCIAL

## 2025-02-13 VITALS
WEIGHT: 204.6 LBS | HEART RATE: 72 BPM | HEIGHT: 67 IN | RESPIRATION RATE: 16 BRPM | BODY MASS INDEX: 32.11 KG/M2 | SYSTOLIC BLOOD PRESSURE: 110 MMHG | OXYGEN SATURATION: 98 % | DIASTOLIC BLOOD PRESSURE: 76 MMHG | TEMPERATURE: 97.3 F

## 2025-02-13 DIAGNOSIS — J45.909 ASTHMA, UNSPECIFIED ASTHMA SEVERITY, UNSPECIFIED WHETHER COMPLICATED, UNSPECIFIED WHETHER PERSISTENT: ICD-10-CM

## 2025-02-13 DIAGNOSIS — Z00.00 ANNUAL PHYSICAL EXAM: Primary | ICD-10-CM

## 2025-02-13 DIAGNOSIS — E78.5 DYSLIPIDEMIA: ICD-10-CM

## 2025-02-13 DIAGNOSIS — E55.9 VITAMIN D DEFICIENCY: ICD-10-CM

## 2025-02-13 DIAGNOSIS — E03.9 HYPOTHYROIDISM, UNSPECIFIED TYPE: ICD-10-CM

## 2025-02-13 PROCEDURE — 99396 PREV VISIT EST AGE 40-64: CPT | Performed by: FAMILY MEDICINE

## 2025-02-13 NOTE — PROGRESS NOTES
Assessment/Plan:   Patient given lab requisition for fasting labs as below.  Patient to continue present treatment.  Instructed to follow a low-fat, low-salt and a low sugar/carbohydrate diet and get regular aerobic exercise 150 minutes/week and weightbearing exercise.  Return to the office in 1 year.   Diagnoses and all orders for this visit:    Annual physical exam  -     CBC; Future  -     UA w Reflex to Microscopic w Reflex to Culture; Future    Hypothyroidism, unspecified type  -     TSH, 3rd generation with Free T4 reflex; Future    Dyslipidemia  -     Comprehensive metabolic panel; Future  -     Lipid Panel with Direct LDL reflex; Future    Vitamin D deficiency  -     Vitamin D 25 hydroxy; Future    Asthma, unspecified asthma severity, unspecified whether complicated, unspecified whether persistent    Other orders  -     Lysine 500 MG CAPS  -     Magnesium 400 MG CAPS  -     Multiple Vitamins-Minerals (Centrum Silver 50+Women) TABS  -     Probiotic Product (PROBIOTIC DAILY PO)          Subjective:     Patient ID: Bri Magallon is a 59 y.o. female.    Patient is here with her  for annual physical exam and follow-up of chronic conditions.  Patient is feeling well overall and exercises regularly doing cardio and some weight training.  Patient recently had colonoscopy with polypectomy and recommended to repeat in 3 years.  Patient is up-to-date on yearly GYN exam and mammogram.    Thyroid Problem  Presents for follow-up visit. Symptoms include constipation and dry skin. Patient reports no anxiety, cold intolerance, depressed mood, diaphoresis, diarrhea, fatigue, hair loss, heat intolerance, hoarse voice, leg swelling, palpitations, tremors, visual change, weight gain or weight loss. The symptoms have been stable.       Review of Systems   Constitutional:  Negative for diaphoresis, fatigue, weight gain and weight loss.   HENT:  Negative for hoarse voice.    Cardiovascular:  Negative for palpitations.    Gastrointestinal:  Positive for constipation. Negative for diarrhea.   Endocrine: Negative for cold intolerance and heat intolerance.   Neurological:  Negative for tremors.   Psychiatric/Behavioral:  The patient is not nervous/anxious.          Objective:     Physical Exam  Constitutional:       General: She is not in acute distress.     Appearance: Normal appearance.   HENT:      Head: Normocephalic.      Mouth/Throat:      Mouth: Mucous membranes are moist.   Eyes:      General: No scleral icterus.     Conjunctiva/sclera: Conjunctivae normal.   Neck:      Vascular: No carotid bruit.   Cardiovascular:      Rate and Rhythm: Normal rate and regular rhythm.   Pulmonary:      Effort: Pulmonary effort is normal.      Breath sounds: Normal breath sounds.   Abdominal:      Palpations: Abdomen is soft.      Tenderness: There is no abdominal tenderness.   Musculoskeletal:      Cervical back: Neck supple.      Right lower leg: No edema.      Left lower leg: No edema.   Lymphadenopathy:      Cervical: No cervical adenopathy.   Skin:     General: Skin is warm and dry.   Neurological:      General: No focal deficit present.      Mental Status: She is alert and oriented to person, place, and time.   Psychiatric:         Mood and Affect: Mood normal.         Behavior: Behavior normal.         Thought Content: Thought content normal.         Judgment: Judgment normal.

## 2025-02-22 LAB
25(OH)D3+25(OH)D2 SERPL-MCNC: 32 NG/ML (ref 30–100)
ALBUMIN SERPL-MCNC: 4.3 G/DL (ref 3.5–5.7)
ALP SERPL-CCNC: 66 U/L (ref 35–120)
ALT SERPL-CCNC: 25 U/L
ANION GAP SERPL CALCULATED.3IONS-SCNC: 7 MMOL/L (ref 3–11)
AST SERPL-CCNC: 23 U/L
BILIRUB SERPL-MCNC: 1.3 MG/DL (ref 0.2–1)
BUN SERPL-MCNC: 13 MG/DL (ref 7–25)
CALCIUM SERPL-MCNC: 9.4 MG/DL (ref 8.5–10.5)
CHLORIDE SERPL-SCNC: 105 MMOL/L (ref 100–109)
CHOLEST SERPL-MCNC: 214 MG/DL
CHOLEST/HDLC SERPL: 3.5 {RATIO}
CO2 SERPL-SCNC: 28 MMOL/L (ref 21–31)
CREAT SERPL-MCNC: 0.64 MG/DL (ref 0.4–1.1)
CYTOLOGY CMNT CVX/VAG CYTO-IMP: ABNORMAL
ERYTHROCYTE [DISTWIDTH] IN BLOOD BY AUTOMATED COUNT: 12.5 % (ref 12–16)
GFR/BSA.PRED SERPLBLD CYS-BASED-ARV: 100 ML/MIN/{1.73_M2}
GLUCOSE SERPL-MCNC: 82 MG/DL (ref 65–99)
HCT VFR BLD AUTO: 41.2 % (ref 35–43)
HDLC SERPL-MCNC: 62 MG/DL (ref 23–92)
HGB BLD-MCNC: 13.5 G/DL (ref 11.5–14.5)
LDLC SERPL CALC-MCNC: 140 MG/DL
MCH RBC QN AUTO: 27.8 PG (ref 26–34)
MCHC RBC AUTO-ENTMCNC: 32.8 G/DL (ref 32–37)
MCV RBC AUTO: 85 FL (ref 80–100)
NONHDLC SERPL-MCNC: 152 MG/DL
PLATELET # BLD AUTO: 264 THOU/CMM (ref 140–350)
PMV BLD REES-ECKER: 8.7 FL (ref 7.5–11.3)
POTASSIUM SERPL-SCNC: 4.5 MMOL/L (ref 3.5–5.2)
PROT SERPL-MCNC: 6.2 G/DL (ref 6.3–8.3)
RBC # BLD AUTO: 4.85 MILL/CMM (ref 3.7–4.7)
SODIUM SERPL-SCNC: 140 MMOL/L (ref 135–145)
TRIGL SERPL-MCNC: 61 MG/DL
TSH SERPL-ACNC: 1.58 UIU/ML (ref 0.45–5.33)
WBC # BLD AUTO: 4.5 THOU/CMM (ref 4–10)

## 2025-02-23 ENCOUNTER — RESULTS FOLLOW-UP (OUTPATIENT)
Dept: FAMILY MEDICINE CLINIC | Facility: CLINIC | Age: 60
End: 2025-02-23

## 2025-03-05 ENCOUNTER — APPOINTMENT (OUTPATIENT)
Dept: URGENT CARE | Facility: CLINIC | Age: 60
End: 2025-03-05
Payer: OTHER MISCELLANEOUS

## 2025-03-05 PROCEDURE — 99283 EMERGENCY DEPT VISIT LOW MDM: CPT | Performed by: NURSE PRACTITIONER

## 2025-03-05 PROCEDURE — G0382 LEV 3 HOSP TYPE B ED VISIT: HCPCS | Performed by: NURSE PRACTITIONER

## 2025-03-13 ENCOUNTER — APPOINTMENT (OUTPATIENT)
Dept: URGENT CARE | Facility: CLINIC | Age: 60
End: 2025-03-13
Payer: OTHER MISCELLANEOUS

## 2025-03-13 PROCEDURE — 99214 OFFICE O/P EST MOD 30 MIN: CPT | Performed by: PHYSICIAN ASSISTANT

## 2025-03-20 ENCOUNTER — APPOINTMENT (OUTPATIENT)
Dept: URGENT CARE | Facility: CLINIC | Age: 60
End: 2025-03-20
Payer: OTHER MISCELLANEOUS

## 2025-03-20 PROCEDURE — 99213 OFFICE O/P EST LOW 20 MIN: CPT | Performed by: PHYSICIAN ASSISTANT

## 2025-03-31 ENCOUNTER — APPOINTMENT (OUTPATIENT)
Dept: URGENT CARE | Facility: CLINIC | Age: 60
End: 2025-03-31
Payer: OTHER MISCELLANEOUS

## 2025-03-31 PROCEDURE — 99213 OFFICE O/P EST LOW 20 MIN: CPT | Performed by: FAMILY MEDICINE

## 2025-04-10 ENCOUNTER — APPOINTMENT (OUTPATIENT)
Dept: URGENT CARE | Facility: CLINIC | Age: 60
End: 2025-04-10
Payer: OTHER MISCELLANEOUS

## 2025-04-10 PROCEDURE — 99214 OFFICE O/P EST MOD 30 MIN: CPT

## 2025-04-17 ENCOUNTER — APPOINTMENT (OUTPATIENT)
Dept: URGENT CARE | Facility: CLINIC | Age: 60
End: 2025-04-17
Payer: OTHER MISCELLANEOUS

## 2025-04-17 PROCEDURE — 99213 OFFICE O/P EST LOW 20 MIN: CPT

## 2025-04-29 ENCOUNTER — APPOINTMENT (OUTPATIENT)
Dept: URGENT CARE | Facility: CLINIC | Age: 60
End: 2025-04-29
Payer: OTHER MISCELLANEOUS

## 2025-04-29 PROCEDURE — 99213 OFFICE O/P EST LOW 20 MIN: CPT | Performed by: PHYSICIAN ASSISTANT

## 2025-05-19 ENCOUNTER — APPOINTMENT (OUTPATIENT)
Dept: URGENT CARE | Facility: CLINIC | Age: 60
End: 2025-05-19
Payer: OTHER MISCELLANEOUS

## 2025-05-19 PROCEDURE — 99213 OFFICE O/P EST LOW 20 MIN: CPT | Performed by: FAMILY MEDICINE

## 2025-05-21 ENCOUNTER — APPOINTMENT (EMERGENCY)
Dept: CT IMAGING | Facility: HOSPITAL | Age: 60
End: 2025-05-21
Payer: COMMERCIAL

## 2025-05-21 ENCOUNTER — HOSPITAL ENCOUNTER (EMERGENCY)
Facility: HOSPITAL | Age: 60
Discharge: HOME | End: 2025-05-21
Attending: EMERGENCY MEDICINE
Payer: COMMERCIAL

## 2025-05-21 VITALS
RESPIRATION RATE: 16 BRPM | SYSTOLIC BLOOD PRESSURE: 151 MMHG | OXYGEN SATURATION: 97 % | HEART RATE: 87 BPM | DIASTOLIC BLOOD PRESSURE: 75 MMHG | TEMPERATURE: 98.4 F

## 2025-05-21 DIAGNOSIS — S09.90XA CLOSED HEAD INJURY, INITIAL ENCOUNTER: Primary | ICD-10-CM

## 2025-05-21 DIAGNOSIS — H53.9 VISION CHANGES: ICD-10-CM

## 2025-05-21 PROCEDURE — 99284 EMERGENCY DEPT VISIT MOD MDM: CPT | Performed by: EMERGENCY MEDICINE

## 2025-05-21 PROCEDURE — 70450 CT HEAD/BRAIN W/O DYE: CPT

## 2025-05-21 PROCEDURE — 99284 EMERGENCY DEPT VISIT MOD MDM: CPT

## 2025-05-21 RX ORDER — ACETAMINOPHEN 325 MG/1
650 TABLET ORAL ONCE
Status: COMPLETED | OUTPATIENT
Start: 2025-05-21 | End: 2025-05-21

## 2025-05-21 RX ORDER — IBUPROFEN 400 MG/1
800 TABLET, FILM COATED ORAL ONCE
Status: COMPLETED | OUTPATIENT
Start: 2025-05-21 | End: 2025-05-21

## 2025-05-21 RX ORDER — DEXAMETHASONE 4 MG/1
4 TABLET ORAL ONCE
Status: COMPLETED | OUTPATIENT
Start: 2025-05-21 | End: 2025-05-21

## 2025-05-21 RX ADMIN — ACETAMINOPHEN 650 MG: 325 TABLET ORAL at 18:03

## 2025-05-21 RX ADMIN — IBUPROFEN 800 MG: 400 TABLET, FILM COATED ORAL at 19:26

## 2025-05-21 RX ADMIN — DEXAMETHASONE 4 MG: 4 TABLET ORAL at 19:26

## 2025-05-21 NOTE — Clinical Note
Bri Magallon was seen and treated in our emergency department on 5/21/2025.                Diagnosis:     Bri  may return to work on return date.    She may return on this date: 05/22/2025         If you have any questions or concerns, please don't hesitate to call.      Rosalie Armenta MD    ______________________________           _______________          _______________  Hospital Representative                              Date                                Time

## 2025-05-21 NOTE — Clinical Note
Bri Magallon was seen and treated in our emergency department on 5/21/2025.                Diagnosis:     Bri  may return to work on return date.    She may return on this date: 05/23/2025         If you have any questions or concerns, please don't hesitate to call.      Rosalie Armenta MD    ______________________________           _______________          _______________  Hospital Representative                              Date                                Time

## 2025-05-21 NOTE — ED PROVIDER NOTES
Time reflects when diagnosis was documented in both MDM as applicable and the Disposition within this note       Time User Action Codes Description Comment    5/21/2025  7:14 PM Rosalie Armenta Add [S09.90XA] Closed head injury, initial encounter     5/21/2025  7:14 PM Rosalie Armenta Add [H53.9] Vision changes           ED Disposition       ED Disposition   Discharge    Condition   Stable    Date/Time   Wed May 21, 2025  7:13 PM    Comment   Bri Magallon discharge to home/self care.                   Assessment & Plan       Medical Decision Making  59-year-old female presenting with visual disturbance as well as right-sided headaches after sustaining close head injury 4 days ago.  Vital signs reviewed, mildly hypertensive, within normal limits otherwise.  Neuroexam is intact.  Patient does not have red flags such as loss of consciousness, focal neurologic deficits, anticoagulation/antiplatelet agents therapy to increased risk of intracranial hemorrhage, however, following shared decision making, CT imaging of the brain pursued, this thankfully revealing no evidence of intracranial hemorrhage, nor of skull fractures.  Patient's headache treated with Tylenol, ibuprofen, and with Decadron to help minimize chance of rebound.  Concerning patient's visual disturbance, this started earlier today.  Differential for this includes refractory error, dry eye syndrome, recurrence of glaucoma, versus another etiology of symptoms.  Eye exam is essentially unremarkable, visual acuity is intact, IOP is 13 OU.  At this time, recommend continue with Tylenol and/ibuprofen for headaches, recommend close follow-up with ophthalmology for reevaluation of eye symptoms, recommend follow-up with the concussion program.  Recommend cutting down on screen time such as working in front of a computer, as this may exacerbate visual symptoms and headache.    Problems Addressed:  Closed head injury, initial encounter: acute illness or  injury  Vision changes: acute illness or injury    Amount and/or Complexity of Data Reviewed  External Data Reviewed: notes.  Radiology: ordered. Decision-making details documented in ED Course.    Risk  OTC drugs.  Prescription drug management.        ED Course as of 05/21/25 2210   Wed May 21, 2025   1842 IOP 13 OD and 13 OS       Medications   acetaminophen (TYLENOL) tablet 650 mg (650 mg Oral Given 5/21/25 1803)   dexamethasone (DECADRON) tablet 4 mg (4 mg Oral Given 5/21/25 1926)   ibuprofen (MOTRIN) tablet 800 mg (800 mg Oral Given 5/21/25 1926)       ED Risk Strat Scores                    No data recorded        SBIRT 20yo+      Flowsheet Row Most Recent Value   Initial Alcohol Screen: US AUDIT-C     1. How often do you have a drink containing alcohol? 0 Filed at: 05/21/2025 1712   2. How many drinks containing alcohol do you have on a typical day you are drinking?  0 Filed at: 05/21/2025 1712   3b. FEMALE Any Age, or MALE 65+: How often do you have 4 or more drinks on one occassion? 0 Filed at: 05/21/2025 1712   Audit-C Score 0 Filed at: 05/21/2025 1712   MONICA: How many times in the past year have you...    Used an illegal drug or used a prescription medication for non-medical reasons? Never Filed at: 05/21/2025 1712                            History of Present Illness       Chief Complaint   Patient presents with    Fall     Fell on Saturday, accidentally ran into a door. Hematoma to above R eyebrow that has decreased significantly per pt. Pt c/o headache and blurry vision. Concern for concussion. No ASA/BT/LOC.       Past Medical History[1]   Past Surgical History[2]   Family History[3]   Social History[4]   E-Cigarette/Vaping    E-Cigarette Use Never User       E-Cigarette/Vaping Substances    Nicotine No     THC No     CBD No     Flavoring No     Other No     Unknown No       I have reviewed and agree with the history as documented.     59-year-old female with past medical history of glaucoma,  currently not on medications since it has been doing well, hypothyroidism, presenting with a closed head injury.  On Saturday, patient struck her forehead on an open microwave door.  She was not knocked back on the floor and did not lose consciousness, however, she did develop a hematoma to her right forehead right away.  Since Saturday, patient has had headaches and has been feeling off.  While driving today, patient realized that when she looks up at the traffic light, she sees not 1 about 3 lights.  There was no associated loss of vision, no difficulty with speech, or focal neurologic deficits.  Patient has been having right-sided headaches involving her right forehead and radiating all the way to the back of her head.  No nausea or vomiting.  No photosensitivity.  She has been taking over-the-counter analgesics such as Advil.  No prior head injuries.    Concerning the vision changes that patient is getting, she sees the traffic light as a wild shape with 2 lights on top and 1 light on the bottom.  It appears to be affecting right eye, left eye is simply blurry.  No tunnel vision.        Review of Systems   Eyes:  Positive for visual disturbance.   Gastrointestinal:  Negative for vomiting.   Neurological:  Positive for headaches. Negative for speech difficulty, weakness and light-headedness.   All other systems reviewed and are negative.          Objective       ED Triage Vitals   Temperature Pulse Blood Pressure Respirations SpO2 Patient Position - Orthostatic VS   05/21/25 1710 05/21/25 1710 05/21/25 1711 05/21/25 1710 05/21/25 1710 05/21/25 1710   98.4 °F (36.9 °C) 87 151/75 16 97 % Sitting      Temp src Heart Rate Source BP Location FiO2 (%) Pain Score    -- 05/21/25 1710 05/21/25 1710 -- --     Monitor Right arm        Vitals      Date and Time Temp Pulse SpO2 Resp BP Pain Score FACES Pain Rating User   05/21/25 1711 -- -- -- -- 151/75 -- -- KM   05/21/25 1710 98.4 °F (36.9 °C) 87 97 % 16 -- -- -- KM             Physical Exam  ED Triage Vitals   Temperature Pulse Respirations Blood Pressure SpO2   05/21/25 1710 05/21/25 1710 05/21/25 1710 05/21/25 1711 05/21/25 1710   98.4 °F (36.9 °C) 87 16 151/75 97 %      Temp src Heart Rate Source Patient Position - Orthostatic VS BP Location FiO2 (%)   -- 05/21/25 1710 05/21/25 1710 05/21/25 1710 --    Monitor Sitting Right arm       Pain Score       --                  Constitutional:  Awake, alert, oriented.  No acute distress.  HEENT:   There is an ecchymosis to right forehead, no underlying bony instability.  Pupils are 2 mm and reactive.  Sclera anicteric, conjunctiva not injected.  Funduscopic exam reveals no evidence of papilledema, exam is limited by nondilated eye exam.  Moist oral mucosa.  Cardiac:  Appears well-perfused  Respiratory:  Breathing comfortably on room air  Abdomen:  Nondistended  Extremities: No midline C or T-spine tenderness.  No deformities, no edema  Integument:  No rashes over exposed areas, cap refill less than 2 seconds  Neurologic:  Awake, alert, and oriented x3. Pupils 2 mm and reactive, extraocular movements are intact.  Face symmetric.  No dysarthria.  5/5 strength in bilateral upper and lower extremities.  Nonfocal exam.  Psychiatric:  Normal affect        Results Reviewed       None            CT head without contrast   Final Interpretation by Alo Santamaria MD (05/21 1908)      No acute intracranial abnormality.                  Workstation performed: [id number]             Procedures    ED Medication and Procedure Management   Prior to Admission Medications   Prescriptions Last Dose Informant Patient Reported? Taking?   Cholecalciferol (VITAMIN D3) 1000 units CAPS  Self Yes No   Sig: Take 2 capsules by mouth daily   Lysine 500 MG CAPS   Yes No   Magnesium 400 MG CAPS   Yes No   Multiple Vitamins-Minerals (Centrum Silver 50+Women) TABS   Yes No   Probiotic Product (PROBIOTIC DAILY PO)   Yes No   Synthroid 112 MCG tablet   No No   Sig:  TAKE 1 TABLET BY MOUTH EVERY MORNING      Facility-Administered Medications: None     Discharge Medication List as of 2025  7:47 PM        CONTINUE these medications which have NOT CHANGED    Details   Cholecalciferol (VITAMIN D3) 1000 units CAPS Take 2 capsules by mouth daily, Starting Wed 2015, Historical Med      Lysine 500 MG CAPS Historical Med      Magnesium 400 MG CAPS Historical Med      Multiple Vitamins-Minerals (Centrum Silver 50+Women) TABS Historical Med      Probiotic Product (PROBIOTIC DAILY PO) Historical Med      Synthroid 112 MCG tablet TAKE 1 TABLET BY MOUTH EVERY MORNING, Starting Sun 2025, Normal             ED SEPSIS DOCUMENTATION   Time reflects when diagnosis was documented in both MDM as applicable and the Disposition within this note       Time User Action Codes Description Comment    2025  7:14 PM Rosalie Armenta Add [S09.90XA] Closed head injury, initial encounter     2025  7:14 PM Rosalie Armenta Add [H53.9] Vision changes                  [1]   Past Medical History:  Diagnosis Date    Allergic     Colon polyp     Disease of thyroid gland     Diverticulitis of colon     Endometriosis     Fibrocystic breast     Fibroids, subserous     GERD (gastroesophageal reflux disease)     Hypothyroidism    [2]   Past Surgical History:  Procedure Laterality Date    CHOLECYSTECTOMY      COLONOSCOPY      per pt    HYSTERECTOMY  2008    right ovary removed also    MYOMECTOMY      OOPHORECTOMY Right     TUBAL LIGATION     [3]   Family History  Problem Relation Name Age of Onset    Heart disease Mother Una Pinto             Hypertension Father Josué Pinto             Kidney disease Father Josué Pinto     Heart disease Father Josué Pinto             Heart disease Sister Mariana Edmundo         CARDIAC DISORDER    Diabetes Sister Mariana Edmundo         MELLITUS    Hypothyroidism Sister Mariana Edmundo         HYPOTHYROIDISM, GOITROUS     Thyroid disease Sister Mariana Wyatt     Glaucoma Sister Mariana Wyatt     Stroke Sister Una Wyatt             Diabetes Sister Una Wyatt     No Known Problems Daughter      No Known Problems Maternal Grandmother      No Known Problems Maternal Grandfather      No Known Problems Paternal Grandmother      No Known Problems Paternal Grandfather      No Known Problems Maternal Aunt      No Known Problems Maternal Aunt      No Known Problems Maternal Aunt      No Known Problems Maternal Aunt      No Known Problems Maternal Aunt     [4]   Social History  Tobacco Use    Smoking status: Former     Current packs/day: 0.00     Average packs/day: 0.3 packs/day for 15.0 years (3.8 ttl pk-yrs)     Types: Cigarettes     Start date: 1989     Quit date: 2004     Years since quittin.8    Smokeless tobacco: Never    Tobacco comments:     quit    Vaping Use    Vaping status: Never Used   Substance Use Topics    Alcohol use: Not Currently     Comment: social    Drug use: No        Rosalie Armenta MD  [phone]

## 2025-05-21 NOTE — DISCHARGE INSTRUCTIONS
CT scan of your head thankfully shows no evidence of intracranial bleeding or fractures.  Your eye pressure today is 13 in your right eye and 13 in your left eye.  We are concerned that your symptoms are due to a concussion after sustaining your injury on Saturday.  Please take Tylenol and/ibuprofen for your headaches.  Please schedule an appointment to see your eye doctor within the next week for further evaluation of your vision changes, and please follow-up with the concussion program.

## 2025-05-22 ENCOUNTER — VBI (OUTPATIENT)
Dept: ADMINISTRATIVE | Facility: OTHER | Age: 60
End: 2025-05-22

## 2025-05-22 NOTE — TELEPHONE ENCOUNTER
05/22/25 11:37 AM    Patient contacted post ED visit, outreach attempt made but message could not be left. Additional outreach attempt will be made.     Thank you.  Talon Manriquez MA  [facility]

## 2025-05-23 NOTE — TELEPHONE ENCOUNTER
05/23/25 10:11 AM    Patient contacted post ED visit, outreach attempt made but message could not be left. Additional outreach attempt will be made.     Thank you.  Talon Manriquez MA  [facility]

## 2025-05-27 NOTE — TELEPHONE ENCOUNTER
05/27/25 10:43 AM    Patient contacted post ED visit, [facility] department spoke with patient/caregiver and outreach was successful.    Thank you.  Talon Manriquez MA  [facility]

## 2025-06-18 ENCOUNTER — NURSE TRIAGE (OUTPATIENT)
Age: 60
End: 2025-06-18

## 2025-06-18 NOTE — TELEPHONE ENCOUNTER
"REASON FOR CONVERSATION: Medication Problem    SYMPTOMS: Headaches    OTHER HEALTH INFORMATION: Head injury, seen at ED and cleared     PROTOCOL DISPOSITION: Discuss With PCP and Callback by Nurse Today (overriding See Within 3 Days in Office)    CARE ADVICE PROVIDED: Appointment offered, care advice provided    PRACTICE FOLLOW-UP: Patient is not able to come into the office, but would like a call back with any care advice her PCP may have to help until her headaches/migraines subside.           Reason for Disposition   MILD - MODERATE headache present > 3 days (72 hours)    Answer Assessment - Initial Assessment Questions  1. LOCATION: \"Where does it hurt?\"       Right side of head   2. ONSET: \"When did the headache start?\" (e.g., minutes, hours, days)       Approx 5/21/25   3. PATTERN: \"Does the pain come and go, or has it been constant since it started?\"      Comes and goes   4. SEVERITY: \"How bad is the pain?\" and \"What does it keep you from doing?\"  (e.g., Scale 1-10; mild, moderate, or severe)      Moderate  5. RECURRENT SYMPTOM: \"Have you ever had headaches before?\" If Yes, ask: \"When was the last time?\" and \"What happened that time?\"       Denies   6. CAUSE: \"What do you think is causing the headache?\"      Head injury   7. MIGRAINE: \"Have you been diagnosed with migraine headaches?\" If Yes, ask: \"Is this headache similar?\"       Yes, after injury   8. HEAD INJURY: \"Has there been any recent injury to the head?\"       Yes, ran into microwave   9. OTHER SYMPTOMS: \"Do you have any other symptoms?\" (e.g., fever, stiff neck, eye pain, sore throat, cold symptoms)      Vision changes  10. PREGNANCY: \"Is there any chance you are pregnant?\" \"When was your last menstrual period?\"        N/A    Answer Assessment - Initial Assessment Questions  1. NAME of MEDICINE: \"What medicine(s) are you calling about?\"      Ibuprofen, Aleve   2. QUESTION: \"What is your question?\" (e.g., double dose of medicine, side effect)      " "Not effective   3. PRESCRIBER: \"Who prescribed the medicine?\" Reason: if prescribed by specialist, call should be referred to that group.      OTC, recommended at ED   4. SYMPTOMS: \"Do you have any symptoms?\" If Yes, ask: \"What symptoms are you having?\"  \"How bad are the symptoms (e.g., mild, moderate, severe)      Headaches (Migraines)   5. PREGNANCY:  \"Is there any chance that you are pregnant?\" \"When was your last menstrual period?\"      N/A    Protocols used: Medication Question Call-Adult-[place], Headache-Adult-[place]    "

## 2025-06-18 NOTE — TELEPHONE ENCOUNTER
Patient calling complaining of headache related to concussion. States she was evaluated at [facility] EMERGENCY DEPARTMENT on 5/21/25. She is complaining of of intermittent headache since this time. She has been taking over the counter pain relievers as recommended, but still having sx. Offered patient follow up visit with Primary Care Provider, patient states she can only be seen virtually as she has no one to cover for her at work at this time.  Warm transferred patient to [place] at Primary Care Provider [facility] clinical line for further assessment and advice. She thanks us for our help!

## 2025-07-06 DIAGNOSIS — E03.9 HYPOTHYROIDISM, UNSPECIFIED TYPE: ICD-10-CM

## 2025-07-08 RX ORDER — LEVOTHYROXINE SODIUM 112 MCG
112 TABLET ORAL EVERY MORNING
Qty: 90 TABLET | Refills: 1 | Status: SHIPPED | OUTPATIENT
Start: 2025-07-08

## 2025-07-18 ENCOUNTER — ESTABLISHED COMPREHENSIVE EXAM (OUTPATIENT)
Dept: URBAN - METROPOLITAN AREA CLINIC 6 | Facility: CLINIC | Age: 60
End: 2025-07-18

## 2025-07-18 DIAGNOSIS — H25.813: ICD-10-CM

## 2025-07-18 DIAGNOSIS — H52.4: ICD-10-CM

## 2025-07-18 PROCEDURE — 92014 COMPRE OPH EXAM EST PT 1/>: CPT

## 2025-07-18 ASSESSMENT — TONOMETRY
OS_IOP_MMHG: 12
OD_IOP_MMHG: 9
OS_IOP_MMHG: 10
OD_IOP_MMHG: 10

## 2025-07-18 ASSESSMENT — KERATOMETRY
OS_K2POWER_DIOPTERS: 45.75
OD_AXISANGLE_DEGREES: 164
OD_K1POWER_DIOPTERS: 45.25
OS_AXISANGLE2_DEGREES: 127
OD_AXISANGLE2_DEGREES: 74
OD_K2POWER_DIOPTERS: 46.00
OS_K1POWER_DIOPTERS: 45.50
OS_AXISANGLE_DEGREES: 37

## 2025-07-18 ASSESSMENT — VISUAL ACUITY
OD_PH: 20/40
OS_PH: 20/40-1
OS_CC: 20/50-2
OD_CC: 20/200

## 2025-08-01 ENCOUNTER — CONSULT (OUTPATIENT)
Dept: FAMILY MEDICINE CLINIC | Facility: CLINIC | Age: 60
End: 2025-08-01
Payer: COMMERCIAL

## 2025-08-01 VITALS
OXYGEN SATURATION: 97 % | HEART RATE: 76 BPM | TEMPERATURE: 96.9 F | BODY MASS INDEX: 31.89 KG/M2 | SYSTOLIC BLOOD PRESSURE: 110 MMHG | HEIGHT: 67 IN | WEIGHT: 203.2 LBS | DIASTOLIC BLOOD PRESSURE: 62 MMHG

## 2025-08-01 DIAGNOSIS — Z01.818 PRE-OP EXAM: Primary | ICD-10-CM

## 2025-08-01 PROCEDURE — 99214 OFFICE O/P EST MOD 30 MIN: CPT | Performed by: FAMILY MEDICINE

## 2025-08-04 PROCEDURE — 93000 ELECTROCARDIOGRAM COMPLETE: CPT | Performed by: FAMILY MEDICINE

## 2025-08-07 ENCOUNTER — CATARACT PRE-OP EXAM (OUTPATIENT)
Dept: URBAN - METROPOLITAN AREA CLINIC 6 | Facility: CLINIC | Age: 60
End: 2025-08-07

## 2025-08-07 DIAGNOSIS — H40.033: ICD-10-CM

## 2025-08-07 DIAGNOSIS — H25.813: ICD-10-CM

## 2025-08-07 DIAGNOSIS — H52.4: ICD-10-CM

## 2025-08-07 PROCEDURE — 92014 COMPRE OPH EXAM EST PT 1/>: CPT

## 2025-08-07 PROCEDURE — 92136 OPHTHALMIC BIOMETRY: CPT

## 2025-08-07 PROCEDURE — 92134 CPTRZ OPH DX IMG PST SGM RTA: CPT | Mod: NC

## 2025-08-07 ASSESSMENT — KERATOMETRY
OD_AXISANGLE_DEGREES: 164
OD_K2POWER_DIOPTERS: 46.00
OS_K1POWER_DIOPTERS: 45.50
OS_K2POWER_DIOPTERS: 45.75
OS_AXISANGLE2_DEGREES: 127
OD_AXISANGLE2_DEGREES: 74
OS_AXISANGLE_DEGREES: 37
OD_K1POWER_DIOPTERS: 45.25

## 2025-08-07 ASSESSMENT — VISUAL ACUITY
OD_PH: 20/40
OD_CC: 20/150+1
OS_PH: 20/40-1
OS_CC: 20/100+2

## 2025-08-07 ASSESSMENT — TONOMETRY
OS_IOP_MMHG: 10
OD_IOP_MMHG: 8

## 2025-08-15 ENCOUNTER — ANNUAL EXAM (OUTPATIENT)
Dept: OBGYN CLINIC | Facility: CLINIC | Age: 60
End: 2025-08-15
Payer: COMMERCIAL

## (undated) RX ORDER — BRIMONIDINE TARTRATE 1 MG/ML: 1 SOLUTION/ DROPS OPHTHALMIC EVERY EVENING

## (undated) RX ORDER — PILOCARPINE HYDROCHLORIDE OPHTHALMIC SOLUTION 10 MG/ML: 1 SOLUTION/ DROPS OPHTHALMIC EVERY EVENING